# Patient Record
Sex: FEMALE | Race: ASIAN | Employment: FULL TIME | URBAN - METROPOLITAN AREA
[De-identification: names, ages, dates, MRNs, and addresses within clinical notes are randomized per-mention and may not be internally consistent; named-entity substitution may affect disease eponyms.]

---

## 2017-09-22 PROCEDURE — 82746 ASSAY OF FOLIC ACID SERUM: CPT | Performed by: INTERNAL MEDICINE

## 2017-09-22 PROCEDURE — 82607 VITAMIN B-12: CPT | Performed by: INTERNAL MEDICINE

## 2018-11-07 PROCEDURE — 87205 SMEAR GRAM STAIN: CPT | Performed by: INTERNAL MEDICINE

## 2018-11-07 PROCEDURE — 87070 CULTURE OTHR SPECIMN AEROBIC: CPT | Performed by: INTERNAL MEDICINE

## 2018-11-07 PROCEDURE — 87186 SC STD MICRODIL/AGAR DIL: CPT | Performed by: INTERNAL MEDICINE

## 2018-11-07 PROCEDURE — 87077 CULTURE AEROBIC IDENTIFY: CPT | Performed by: INTERNAL MEDICINE

## 2021-04-26 ENCOUNTER — OFFICE VISIT (OUTPATIENT)
Dept: FAMILY MEDICINE CLINIC | Facility: CLINIC | Age: 51
End: 2021-04-26
Payer: COMMERCIAL

## 2021-04-26 VITALS
DIASTOLIC BLOOD PRESSURE: 82 MMHG | BODY MASS INDEX: 30.97 KG/M2 | TEMPERATURE: 98 F | OXYGEN SATURATION: 96 % | SYSTOLIC BLOOD PRESSURE: 120 MMHG | RESPIRATION RATE: 16 BRPM | HEIGHT: 63.5 IN | WEIGHT: 177 LBS | HEART RATE: 66 BPM

## 2021-04-26 DIAGNOSIS — Z00.00 PHYSICAL EXAM, ANNUAL: Primary | ICD-10-CM

## 2021-04-26 DIAGNOSIS — Z12.31 ENCOUNTER FOR SCREENING MAMMOGRAM FOR MALIGNANT NEOPLASM OF BREAST: ICD-10-CM

## 2021-04-26 DIAGNOSIS — Z12.4 SCREENING FOR MALIGNANT NEOPLASM OF CERVIX: ICD-10-CM

## 2021-04-26 DIAGNOSIS — N91.2 AMENORRHEA: ICD-10-CM

## 2021-04-26 DIAGNOSIS — Z13.89 SCREENING FOR GENITOURINARY CONDITION: ICD-10-CM

## 2021-04-26 DIAGNOSIS — Z12.11 SCREEN FOR COLON CANCER: ICD-10-CM

## 2021-04-26 DIAGNOSIS — Z00.00 LABORATORY EXAMINATION ORDERED AS PART OF A ROUTINE GENERAL MEDICAL EXAMINATION: ICD-10-CM

## 2021-04-26 PROCEDURE — 88175 CYTOPATH C/V AUTO FLUID REDO: CPT | Performed by: FAMILY MEDICINE

## 2021-04-26 PROCEDURE — 3074F SYST BP LT 130 MM HG: CPT | Performed by: FAMILY MEDICINE

## 2021-04-26 PROCEDURE — 87624 HPV HI-RISK TYP POOLED RSLT: CPT | Performed by: FAMILY MEDICINE

## 2021-04-26 PROCEDURE — 3008F BODY MASS INDEX DOCD: CPT | Performed by: FAMILY MEDICINE

## 2021-04-26 PROCEDURE — 99386 PREV VISIT NEW AGE 40-64: CPT | Performed by: FAMILY MEDICINE

## 2021-04-26 PROCEDURE — 3079F DIAST BP 80-89 MM HG: CPT | Performed by: FAMILY MEDICINE

## 2021-04-26 NOTE — PROGRESS NOTES
HPI:   Delaney Mcallister is a 45year old female who presents for a complete physical exam. Symptoms: denies discharge, itching, burning or dysuria. She is new in our office. Her previous primary care doctor retired. Patient has no complaints.   Her last menstru Value   12/06/2013 23   05/21/2010 24   07/01/2008 22     ALT (U/L)   Date Value   09/22/2017 55 (H)      Glucose   Date Value Ref Range Status   07/16/2014 81 65 - 99 mg/dL Final   12/06/2013 104 (H) 65 - 99 mg/dL Final   05/21/2010 85 65 - 99 mg/dL Final 3/2020  MUSCULOSKELETAL: denies back pain  NEURO: denies headaches  PSYCHE: denies depression or anxiety  HEMATOLOGIC: denies hx of anemia  ENDOCRINE: denies thyroid history  ALL/ASTHMA: denies hx of allergy or asthma    EXAM:   /82 (BP Location: [place] ordered in this encounter   Call [phone] to schedule Mammogram and fasting blood work. Call GI doctor to schedule colonoscopy. Healthy diet. Stay active.     Imaging & Consults:  GASTRO - INTERNAL  TONIE ANABEL 2D+3D SCREENING BILAT (CPT=[id number])

## 2021-04-26 NOTE — PATIENT INSTRUCTIONS
Call [phone] to schedule Mammogram and fasting blood work. Call GI doctor to schedule colonoscopy. Healthy diet. Stay active.

## 2021-04-27 ENCOUNTER — TELEPHONE (OUTPATIENT)
Dept: FAMILY MEDICINE CLINIC | Facility: CLINIC | Age: 51
End: 2021-04-27

## 2021-04-27 DIAGNOSIS — E55.9 VITAMIN D DEFICIENCY: Primary | ICD-10-CM

## 2021-04-27 NOTE — TELEPHONE ENCOUNTER
Pt checked with insurance and would like to have Vit D level orders put in. Pt's  is requesting a call when orders are entered so they know when to complete.

## 2021-04-27 NOTE — TELEPHONE ENCOUNTER
Call to pt reaches mira/spouse-listed on hipaa consent-advised dr hedrick vitamin d lab order as requested.    mira sts pt will do this with other labs ordered for physical-asks if pt needs to fast.   Advised does need to fast for 12 hours-no food or drink exc

## 2021-05-03 ENCOUNTER — LAB ENCOUNTER (OUTPATIENT)
Dept: LAB | Age: 51
End: 2021-05-03
Attending: FAMILY MEDICINE
Payer: COMMERCIAL

## 2021-05-03 DIAGNOSIS — Z00.00 LABORATORY EXAMINATION ORDERED AS PART OF A ROUTINE GENERAL MEDICAL EXAMINATION: ICD-10-CM

## 2021-05-03 DIAGNOSIS — R73.9 HYPERGLYCEMIA: ICD-10-CM

## 2021-05-03 DIAGNOSIS — Z13.89 SCREENING FOR GENITOURINARY CONDITION: ICD-10-CM

## 2021-05-03 DIAGNOSIS — N91.2 AMENORRHEA: ICD-10-CM

## 2021-05-03 DIAGNOSIS — E55.9 VITAMIN D DEFICIENCY: ICD-10-CM

## 2021-05-03 PROCEDURE — 83036 HEMOGLOBIN GLYCOSYLATED A1C: CPT

## 2021-05-03 PROCEDURE — 84443 ASSAY THYROID STIM HORMONE: CPT

## 2021-05-03 PROCEDURE — 87086 URINE CULTURE/COLONY COUNT: CPT

## 2021-05-03 PROCEDURE — 80053 COMPREHEN METABOLIC PANEL: CPT

## 2021-05-03 PROCEDURE — 81001 URINALYSIS AUTO W/SCOPE: CPT

## 2021-05-03 PROCEDURE — 85025 COMPLETE CBC W/AUTO DIFF WBC: CPT

## 2021-05-03 PROCEDURE — 83001 ASSAY OF GONADOTROPIN (FSH): CPT

## 2021-05-03 PROCEDURE — 82306 VITAMIN D 25 HYDROXY: CPT

## 2021-05-03 PROCEDURE — 36415 COLL VENOUS BLD VENIPUNCTURE: CPT

## 2021-05-03 PROCEDURE — 80061 LIPID PANEL: CPT

## 2021-05-07 ENCOUNTER — PATIENT MESSAGE (OUTPATIENT)
Dept: FAMILY MEDICINE CLINIC | Facility: CLINIC | Age: 51
End: 2021-05-07

## 2021-05-07 ENCOUNTER — ORDER TRANSCRIPTION (OUTPATIENT)
Dept: ADMINISTRATIVE | Facility: HOSPITAL | Age: 51
End: 2021-05-07

## 2021-05-07 DIAGNOSIS — Z13.9 ENCOUNTER FOR SCREENING: Primary | ICD-10-CM

## 2021-05-07 RX ORDER — ERGOCALCIFEROL 1.25 MG/1
50000 CAPSULE ORAL WEEKLY
Qty: 12 CAPSULE | Refills: 0 | Status: SHIPPED | OUTPATIENT
Start: 2021-05-07 | End: 2023-08-18 | Stop reason: ALTCHOICE

## 2021-05-12 NOTE — TELEPHONE ENCOUNTER
From: Igor Giordano  To: Jane Nuñez MD  Sent: 5/7/2021 9:13 PM CDT  Subject: Test Results Question    Dear Dr. Bae,    I got my lab test results of 5/3/2021.  I saw under some results there is a note saying that taking biotin supplement may affect

## 2021-05-12 NOTE — TELEPHONE ENCOUNTER
Yes taking biotin could affect thyroid and FSH. If patient would like to have we could have it rechecked but are not sure how that would be covered with patient insurance.   We do not have biotin listed as one of the medication she is on please update the

## 2021-05-13 RX ORDER — GLUCOSAMINE/CHONDR SU A SOD 750-600 MG
TABLET ORAL DAILY
Refills: 0 | COMMUNITY
Start: 2021-05-13

## 2021-05-13 RX ORDER — MULTIVIT-MIN/IRON/FOLIC ACID/K 18-600-40
CAPSULE ORAL DAILY
Qty: 30 CAPSULE | Refills: 0 | COMMUNITY
Start: 2021-05-13 | End: 2021-06-22

## 2021-05-30 ENCOUNTER — HOSPITAL ENCOUNTER (OUTPATIENT)
Dept: ULTRASOUND IMAGING | Age: 51
Discharge: HOME | End: 2021-05-30
Attending: FAMILY MEDICINE
Payer: COMMERCIAL

## 2021-05-30 DIAGNOSIS — R74.8 ELEVATED LIVER ENZYMES: ICD-10-CM

## 2021-05-30 PROCEDURE — 76700 US EXAM ABDOM COMPLETE: CPT | Performed by: FAMILY MEDICINE

## 2021-06-16 ENCOUNTER — HOSPITAL ENCOUNTER (OUTPATIENT)
Dept: CT IMAGING | Facility: HOSPITAL | Age: 51
Discharge: HOME | End: 2021-06-16
Attending: FAMILY MEDICINE

## 2021-06-16 DIAGNOSIS — Z13.9 ENCOUNTER FOR SCREENING: ICD-10-CM

## 2021-06-16 DIAGNOSIS — Z13.6 SCREENING FOR CARDIOVASCULAR CONDITION: ICD-10-CM

## 2021-06-29 DIAGNOSIS — R73.9 HYPERGLYCEMIA: ICD-10-CM

## 2021-06-29 DIAGNOSIS — E78.5 DYSLIPIDEMIA (HIGH LDL; LOW HDL): ICD-10-CM

## 2021-06-29 DIAGNOSIS — E55.9 VITAMIN D DEFICIENCY: Primary | ICD-10-CM

## 2021-07-03 ENCOUNTER — LAB ENCOUNTER (OUTPATIENT)
Dept: LAB | Facility: HOSPITAL | Age: 51
End: 2021-07-03
Attending: INTERNAL MEDICINE
Payer: COMMERCIAL

## 2021-07-03 DIAGNOSIS — Z01.818 PREOP TESTING: ICD-10-CM

## 2021-07-03 LAB — SARS-COV-2 RNA RESP QL NAA+PROBE: NOT DETECTED

## 2021-07-06 PROBLEM — Z12.11 SPECIAL SCREENING FOR MALIGNANT NEOPLASM OF COLON: Status: ACTIVE | Noted: 2021-07-06

## 2021-07-07 ENCOUNTER — HOSPITAL ENCOUNTER (OUTPATIENT)
Dept: MAMMOGRAPHY | Age: 51
Discharge: HOME | End: 2021-07-07
Attending: FAMILY MEDICINE
Payer: COMMERCIAL

## 2021-07-07 ENCOUNTER — LAB ENCOUNTER (OUTPATIENT)
Dept: LAB | Age: 51
End: 2021-07-07
Attending: FAMILY MEDICINE
Payer: COMMERCIAL

## 2021-07-07 DIAGNOSIS — Z12.31 ENCOUNTER FOR SCREENING MAMMOGRAM FOR MALIGNANT NEOPLASM OF BREAST: ICD-10-CM

## 2021-07-07 DIAGNOSIS — R31.21 ASYMPTOMATIC MICROSCOPIC HEMATURIA: ICD-10-CM

## 2021-07-07 DIAGNOSIS — R74.8 ELEVATED LIVER ENZYMES: ICD-10-CM

## 2021-07-07 LAB
BILIRUB UR QL STRIP.AUTO: NEGATIVE
CLARITY UR REFRACT.AUTO: CLEAR
GLUCOSE UR STRIP.AUTO-MCNC: NEGATIVE MG/DL
HAV IGM SER QL: NONREACTIVE
HBV CORE IGM SER QL: NONREACTIVE
HBV SURFACE AG SERPL QL IA: NONREACTIVE
HCV AB SERPL QL IA: NONREACTIVE
KETONES UR STRIP.AUTO-MCNC: NEGATIVE MG/DL
NITRITE UR QL STRIP.AUTO: NEGATIVE
PH UR STRIP.AUTO: 6 [PH] (ref 5–8)
PROT UR STRIP.AUTO-MCNC: NEGATIVE MG/DL
SP GR UR STRIP.AUTO: 1.01 (ref 1–1.03)
UROBILINOGEN UR STRIP.AUTO-MCNC: <2 MG/DL

## 2021-07-07 PROCEDURE — 36415 COLL VENOUS BLD VENIPUNCTURE: CPT

## 2021-07-07 PROCEDURE — 87086 URINE CULTURE/COLONY COUNT: CPT

## 2021-07-07 PROCEDURE — 81001 URINALYSIS AUTO W/SCOPE: CPT

## 2021-07-07 PROCEDURE — 77067 SCR MAMMO BI INCL CAD: CPT | Performed by: FAMILY MEDICINE

## 2021-07-07 PROCEDURE — 80074 ACUTE HEPATITIS PANEL: CPT

## 2021-07-07 PROCEDURE — 77063 BREAST TOMOSYNTHESIS BI: CPT | Performed by: FAMILY MEDICINE

## 2021-11-29 ENCOUNTER — OFFICE VISIT (OUTPATIENT)
Dept: FAMILY MEDICINE CLINIC | Facility: CLINIC | Age: 51
End: 2021-11-29
Payer: COMMERCIAL

## 2021-11-29 VITALS
RESPIRATION RATE: 16 BRPM | HEIGHT: 63.5 IN | SYSTOLIC BLOOD PRESSURE: 110 MMHG | DIASTOLIC BLOOD PRESSURE: 62 MMHG | HEART RATE: 82 BPM | TEMPERATURE: 98 F | WEIGHT: 177 LBS | OXYGEN SATURATION: 97 % | BODY MASS INDEX: 30.97 KG/M2

## 2021-11-29 DIAGNOSIS — R73.9 HYPERGLYCEMIA: ICD-10-CM

## 2021-11-29 DIAGNOSIS — Z23 NEED FOR VACCINATION: ICD-10-CM

## 2021-11-29 DIAGNOSIS — R31.9 HEMATURIA, UNSPECIFIED TYPE: ICD-10-CM

## 2021-11-29 DIAGNOSIS — E55.9 VITAMIN D DEFICIENCY: ICD-10-CM

## 2021-11-29 DIAGNOSIS — E78.00 HYPERCHOLESTEREMIA: Primary | ICD-10-CM

## 2021-11-29 DIAGNOSIS — R79.89 ELEVATED LIVER FUNCTION TESTS: ICD-10-CM

## 2021-11-29 DIAGNOSIS — G47.9 SLEEP DIFFICULTIES: ICD-10-CM

## 2021-11-29 PROCEDURE — 3008F BODY MASS INDEX DOCD: CPT | Performed by: FAMILY MEDICINE

## 2021-11-29 PROCEDURE — 90471 IMMUNIZATION ADMIN: CPT | Performed by: FAMILY MEDICINE

## 2021-11-29 PROCEDURE — 3078F DIAST BP <80 MM HG: CPT | Performed by: FAMILY MEDICINE

## 2021-11-29 PROCEDURE — 99214 OFFICE O/P EST MOD 30 MIN: CPT | Performed by: FAMILY MEDICINE

## 2021-11-29 PROCEDURE — 90686 IIV4 VACC NO PRSV 0.5 ML IM: CPT | Performed by: FAMILY MEDICINE

## 2021-11-29 PROCEDURE — 3074F SYST BP LT 130 MM HG: CPT | Performed by: FAMILY MEDICINE

## 2021-11-29 RX ORDER — ZOLPIDEM TARTRATE 5 MG
5 TABLET ORAL NIGHTLY PRN
Qty: 30 TABLET | Refills: 1 | Status: SHIPPED | OUTPATIENT
Start: 2021-11-29

## 2021-11-29 NOTE — PATIENT INSTRUCTIONS
Healthy diet low-fat low carbohydrate. Try to lose some weight. Continue vitamin D at current dose. Recheck blood work in January. Stay active. Use Ambien as needed for sleep.

## 2021-11-29 NOTE — PROGRESS NOTES
Keon Garcia is a 45year old female.   Cc discuss test results, hypercholesterolemia hyperglycemia vitamin D deficiency, elevated liver function test hematuria, sleeping difficulty  HPI:   Patient is come to the office to discuss test results she had done in [facility] total) by mouth once a week. 12 capsule 0   • Budesonide-Formoterol Fumarate (SYMBICORT) 160-4.5 MCG/ACT Inhalation Aerosol Inhale 2 puffs into the lungs 2 (two) times daily.  1 Inhaler 0   • Albuterol Sulfate  (90 Base) MCG/ACT Inhalation Aero Soln oriented x3, normal mood   PROCEDURE:  CT CALCIUM SCORING       COMPARISON:  None.       INDICATIONS:  Z13.6 Screening for cardiovascular condition       TECHNIQUE:  The patient was placed in the supine position on the multidector CT table at [facility] physician, if this is not correct please contact Abimael Oviedo at [phone] and he will assign this report to another physician.           Dictated by (CST): Michel Kc MD on 6/24/2021 at 9:17 AM       Finalized by (CST): Michel Kc MD on 6/24/2021 a Panel (14)      Hemoglobin A1C      UA/M With Culture Reflex [E]      FLULAVAL INFLUENZA VACCINE QUAD PRESERVATIVE FREE 0.5 ML      Meds & Refills for this Visit:  Requested Prescriptions     Signed Prescriptions Disp Refills   • AMBIEN 5 MG Oral Tab 30 ta

## 2021-12-06 ENCOUNTER — MED REC SCAN ONLY (OUTPATIENT)
Dept: FAMILY MEDICINE CLINIC | Facility: CLINIC | Age: 51
End: 2021-12-06

## 2022-03-11 ENCOUNTER — LABORATORY ENCOUNTER (OUTPATIENT)
Dept: LAB | Age: 52
End: 2022-03-11
Attending: FAMILY MEDICINE
Payer: COMMERCIAL

## 2022-03-11 DIAGNOSIS — R31.9 HEMATURIA, UNSPECIFIED TYPE: ICD-10-CM

## 2022-03-11 DIAGNOSIS — E55.9 VITAMIN D DEFICIENCY: ICD-10-CM

## 2022-03-11 DIAGNOSIS — E78.5 DYSLIPIDEMIA (HIGH LDL; LOW HDL): ICD-10-CM

## 2022-03-11 DIAGNOSIS — R73.9 HYPERGLYCEMIA: ICD-10-CM

## 2022-03-11 DIAGNOSIS — E78.00 HYPERCHOLESTEREMIA: ICD-10-CM

## 2022-03-11 LAB
ALBUMIN SERPL-MCNC: 4.1 G/DL (ref 3.4–5)
ALBUMIN/GLOB SERPL: 1.1 {RATIO} (ref 1–2)
ALP LIVER SERPL-CCNC: 62 U/L
ALT SERPL-CCNC: 52 U/L
ANION GAP SERPL CALC-SCNC: 6 MMOL/L (ref 0–18)
AST SERPL-CCNC: 31 U/L (ref 15–37)
BILIRUB SERPL-MCNC: 0.9 MG/DL (ref 0.1–2)
BILIRUB UR QL STRIP.AUTO: NEGATIVE
BUN BLD-MCNC: 16 MG/DL (ref 7–18)
CALCIUM BLD-MCNC: 9.8 MG/DL (ref 8.5–10.1)
CHLORIDE SERPL-SCNC: 106 MMOL/L (ref 98–112)
CHOLEST SERPL-MCNC: 280 MG/DL (ref ?–200)
CLARITY UR REFRACT.AUTO: CLEAR
CO2 SERPL-SCNC: 27 MMOL/L (ref 21–32)
COLOR UR AUTO: YELLOW
CREAT BLD-MCNC: 0.84 MG/DL
EST. AVERAGE GLUCOSE BLD GHB EST-MCNC: 114 MG/DL (ref 68–126)
FASTING PATIENT LIPID ANSWER: YES
FASTING STATUS PATIENT QL REPORTED: YES
GLOBULIN PLAS-MCNC: 3.6 G/DL (ref 2.8–4.4)
GLUCOSE BLD-MCNC: 108 MG/DL (ref 70–99)
GLUCOSE UR STRIP.AUTO-MCNC: NEGATIVE MG/DL
HBA1C MFR BLD: 5.6 % (ref ?–5.7)
HDLC SERPL-MCNC: 44 MG/DL (ref 40–59)
KETONES UR STRIP.AUTO-MCNC: NEGATIVE MG/DL
LDLC SERPL CALC-MCNC: 183 MG/DL (ref ?–100)
LEUKOCYTE ESTERASE UR QL STRIP.AUTO: NEGATIVE
NITRITE UR QL STRIP.AUTO: NEGATIVE
NONHDLC SERPL-MCNC: 236 MG/DL (ref ?–130)
OSMOLALITY SERPL CALC.SUM OF ELEC: 290 MOSM/KG (ref 275–295)
PH UR STRIP.AUTO: 7 [PH] (ref 5–8)
POTASSIUM SERPL-SCNC: 4.4 MMOL/L (ref 3.5–5.1)
PROT SERPL-MCNC: 7.7 G/DL (ref 6.4–8.2)
PROT UR STRIP.AUTO-MCNC: NEGATIVE MG/DL
SODIUM SERPL-SCNC: 139 MMOL/L (ref 136–145)
SP GR UR STRIP.AUTO: 1.02 (ref 1–1.03)
TRIGL SERPL-MCNC: 276 MG/DL (ref 30–149)
UROBILINOGEN UR STRIP.AUTO-MCNC: <2 MG/DL
VIT D+METAB SERPL-MCNC: 36.8 NG/ML (ref 30–100)
VLDLC SERPL CALC-MCNC: 58 MG/DL (ref 0–30)

## 2022-03-11 PROCEDURE — 80061 LIPID PANEL: CPT

## 2022-03-11 PROCEDURE — 80053 COMPREHEN METABOLIC PANEL: CPT

## 2022-03-11 PROCEDURE — 83036 HEMOGLOBIN GLYCOSYLATED A1C: CPT

## 2022-03-11 PROCEDURE — 82306 VITAMIN D 25 HYDROXY: CPT

## 2022-03-11 PROCEDURE — 81001 URINALYSIS AUTO W/SCOPE: CPT

## 2022-03-20 ENCOUNTER — TELEPHONE (OUTPATIENT)
Dept: FAMILY MEDICINE CLINIC | Facility: CLINIC | Age: 52
End: 2022-03-20

## 2022-03-20 NOTE — TELEPHONE ENCOUNTER
I have released patient test results to [employer]. Patient needs to review those results or please discuss those results with her over the phone.   Thank you

## 2022-03-21 NOTE — TELEPHONE ENCOUNTER
Call to pt's cell. Reaches identified VM. Per HIPAA consent, LVM requesting call back to triage nurse today for results/instructions. Provided call back number and ofc phone hours.

## 2022-03-24 NOTE — TELEPHONE ENCOUNTER
Record shows pt has not viewed dr Romero [employer] message re lab results. Call to pt's cell reaches identified voice mail. Per hipaa consent, left vmm req call back to triage nurse tomorrow to discuss test results/dr instructions.  Provided ofc phone hours/contact number

## 2022-03-25 NOTE — TELEPHONE ENCOUNTER
Call back from pt-sts she has been out of town, has not viewed lab results. Advised of dr srivastava's comments in lab result notes. Discussed view American [employer] Association web site for info regarding DASH diet or search online for details of this diet-try to follow these recommendations-dr will discuss further at appt next month. Patient voices understanding/agrees with plan/no further questions.

## 2022-04-29 ENCOUNTER — OFFICE VISIT (OUTPATIENT)
Dept: FAMILY MEDICINE CLINIC | Facility: CLINIC | Age: 52
End: 2022-04-29
Payer: COMMERCIAL

## 2022-04-29 VITALS
WEIGHT: 178 LBS | DIASTOLIC BLOOD PRESSURE: 78 MMHG | RESPIRATION RATE: 14 BRPM | SYSTOLIC BLOOD PRESSURE: 102 MMHG | TEMPERATURE: 97 F | HEART RATE: 68 BPM | HEIGHT: 63.5 IN | BODY MASS INDEX: 31.15 KG/M2

## 2022-04-29 DIAGNOSIS — Z00.00 ANNUAL PHYSICAL EXAM: ICD-10-CM

## 2022-04-29 DIAGNOSIS — R73.9 HYPERGLYCEMIA: ICD-10-CM

## 2022-04-29 DIAGNOSIS — Z12.31 ENCOUNTER FOR SCREENING MAMMOGRAM FOR MALIGNANT NEOPLASM OF BREAST: Primary | ICD-10-CM

## 2022-04-29 DIAGNOSIS — E78.00 HYPERCHOLESTEREMIA: ICD-10-CM

## 2022-04-29 DIAGNOSIS — Z00.00 LABORATORY EXAMINATION ORDERED AS PART OF A ROUTINE GENERAL MEDICAL EXAMINATION: ICD-10-CM

## 2022-04-29 PROCEDURE — 99396 PREV VISIT EST AGE 40-64: CPT | Performed by: FAMILY MEDICINE

## 2022-04-29 PROCEDURE — 3078F DIAST BP <80 MM HG: CPT | Performed by: FAMILY MEDICINE

## 2022-04-29 PROCEDURE — 3074F SYST BP LT 130 MM HG: CPT | Performed by: FAMILY MEDICINE

## 2022-04-29 PROCEDURE — 3008F BODY MASS INDEX DOCD: CPT | Performed by: FAMILY MEDICINE

## 2022-04-29 RX ORDER — ZOLPIDEM TARTRATE 10 MG/1
10 TABLET ORAL NIGHTLY
Qty: 30 TABLET | Refills: 2 | Status: SHIPPED | OUTPATIENT
Start: 2022-04-29 | End: 2023-04-24

## 2022-04-29 NOTE — PATIENT INSTRUCTIONS
Hung- shingles shot. Healthy diet. Low-carb low-fat. Stay active. Call [phone] to schedule Mammogram after July 7, 2022. Schedule follow-up visit in October 2022 and do fasting blood work 1 week prior to that visit.

## 2022-04-30 ENCOUNTER — MED REC SCAN ONLY (OUTPATIENT)
Dept: FAMILY MEDICINE CLINIC | Facility: CLINIC | Age: 52
End: 2022-04-30

## 2023-05-08 RX ORDER — ZOLPIDEM TARTRATE 5 MG
5 TABLET ORAL NIGHTLY PRN
Qty: 15 TABLET | Refills: 0 | Status: SHIPPED | OUTPATIENT
Start: 2023-05-08

## 2023-06-05 ENCOUNTER — TELEPHONE (OUTPATIENT)
Dept: FAMILY MEDICINE CLINIC | Facility: CLINIC | Age: 53
End: 2023-06-05

## 2023-06-05 NOTE — TELEPHONE ENCOUNTER
I spoke with pt.s spouse. There was a prescription for Zolpidem 10 mg # 30 0 refills sent to the [address] [place] [address] on Francy Bower on 05/12/23. The order says transmission failed. I called and left a message regarding the refill on the [employer]. I tried to call them three times to speak with them directly and I was sent to voicemails that were not set up.

## 2023-06-05 NOTE — TELEPHONE ENCOUNTER
Pt spouse requesting to s/w nurse regarding Ambien rx. States he was previously speaking with our office regarding the dosage of this medication. Per spouse there was confusion regarding if rx was previously prescribed as 5mg or 10mg. Please contact to discuss further, thank you!

## 2023-08-18 ENCOUNTER — LAB ENCOUNTER (OUTPATIENT)
Dept: LAB | Age: 53
End: 2023-08-18
Attending: FAMILY MEDICINE
Payer: COMMERCIAL

## 2023-08-18 ENCOUNTER — MED REC SCAN ONLY (OUTPATIENT)
Dept: FAMILY MEDICINE CLINIC | Facility: CLINIC | Age: 53
End: 2023-08-18

## 2023-08-18 ENCOUNTER — HOSPITAL ENCOUNTER (OUTPATIENT)
Dept: MAMMOGRAPHY | Age: 53
Discharge: HOME | End: 2023-08-18
Attending: FAMILY MEDICINE
Payer: COMMERCIAL

## 2023-08-18 ENCOUNTER — HOSPITAL ENCOUNTER (OUTPATIENT)
Dept: GENERAL RADIOLOGY | Age: 53
Discharge: HOME | End: 2023-08-18
Attending: FAMILY MEDICINE
Payer: COMMERCIAL

## 2023-08-18 ENCOUNTER — OFFICE VISIT (OUTPATIENT)
Dept: FAMILY MEDICINE CLINIC | Facility: CLINIC | Age: 53
End: 2023-08-18
Payer: COMMERCIAL

## 2023-08-18 VITALS
SYSTOLIC BLOOD PRESSURE: 100 MMHG | WEIGHT: 175 LBS | BODY MASS INDEX: 30.62 KG/M2 | TEMPERATURE: 98 F | DIASTOLIC BLOOD PRESSURE: 74 MMHG | HEIGHT: 63.5 IN | HEART RATE: 68 BPM | RESPIRATION RATE: 18 BRPM

## 2023-08-18 DIAGNOSIS — M25.561 CHRONIC PAIN OF BOTH KNEES: ICD-10-CM

## 2023-08-18 DIAGNOSIS — M25.562 CHRONIC PAIN OF BOTH KNEES: ICD-10-CM

## 2023-08-18 DIAGNOSIS — Z00.00 PHYSICAL EXAM, ANNUAL: ICD-10-CM

## 2023-08-18 DIAGNOSIS — G89.29 CHRONIC PAIN OF BOTH KNEES: ICD-10-CM

## 2023-08-18 DIAGNOSIS — Z12.11 SCREENING FOR COLON CANCER: ICD-10-CM

## 2023-08-18 DIAGNOSIS — R73.9 HYPERGLYCEMIA: ICD-10-CM

## 2023-08-18 DIAGNOSIS — Z13.89 SCREENING FOR GENITOURINARY CONDITION: ICD-10-CM

## 2023-08-18 DIAGNOSIS — H00.15 CHALAZION OF LEFT LOWER EYELID: ICD-10-CM

## 2023-08-18 DIAGNOSIS — Z12.31 SCREENING MAMMOGRAM FOR BREAST CANCER: ICD-10-CM

## 2023-08-18 DIAGNOSIS — R73.9 HYPERGLYCEMIA: Primary | ICD-10-CM

## 2023-08-18 DIAGNOSIS — Z00.00 LABORATORY EXAMINATION ORDERED AS PART OF A ROUTINE GENERAL MEDICAL EXAMINATION: ICD-10-CM

## 2023-08-18 DIAGNOSIS — Z00.00 PHYSICAL EXAM, ANNUAL: Primary | ICD-10-CM

## 2023-08-18 LAB
ALBUMIN SERPL-MCNC: 4.1 G/DL (ref 3.4–5)
ALBUMIN/GLOB SERPL: 1.1 {RATIO} (ref 1–2)
ALP LIVER SERPL-CCNC: 54 U/L
ALT SERPL-CCNC: 56 U/L
ANION GAP SERPL CALC-SCNC: 5 MMOL/L (ref 0–18)
AST SERPL-CCNC: 27 U/L (ref 15–37)
BASOPHILS # BLD AUTO: 0.07 X10(3) UL (ref 0–0.2)
BASOPHILS NFR BLD AUTO: 1.2 %
BILIRUB SERPL-MCNC: 0.9 MG/DL (ref 0.1–2)
BILIRUB UR QL STRIP.AUTO: NEGATIVE
BUN BLD-MCNC: 11 MG/DL (ref 7–18)
CALCIUM BLD-MCNC: 9.6 MG/DL (ref 8.5–10.1)
CHLORIDE SERPL-SCNC: 108 MMOL/L (ref 98–112)
CHOLEST SERPL-MCNC: 263 MG/DL (ref ?–200)
CLARITY UR REFRACT.AUTO: CLEAR
CO2 SERPL-SCNC: 26 MMOL/L (ref 21–32)
CREAT BLD-MCNC: 0.85 MG/DL
EGFRCR SERPLBLD CKD-EPI 2021: 81 ML/MIN/1.73M2 (ref 60–?)
EOSINOPHIL # BLD AUTO: 0.16 X10(3) UL (ref 0–0.7)
EOSINOPHIL NFR BLD AUTO: 2.7 %
ERYTHROCYTE [DISTWIDTH] IN BLOOD BY AUTOMATED COUNT: 11.8 %
FASTING PATIENT LIPID ANSWER: YES
FASTING STATUS PATIENT QL REPORTED: YES
GLOBULIN PLAS-MCNC: 3.6 G/DL (ref 2.8–4.4)
GLUCOSE BLD-MCNC: 111 MG/DL (ref 70–99)
GLUCOSE UR STRIP.AUTO-MCNC: NORMAL MG/DL
HCT VFR BLD AUTO: 44.5 %
HDLC SERPL-MCNC: 40 MG/DL (ref 40–59)
HGB BLD-MCNC: 14.8 G/DL
IMM GRANULOCYTES # BLD AUTO: 0.02 X10(3) UL (ref 0–1)
IMM GRANULOCYTES NFR BLD: 0.3 %
KETONES UR STRIP.AUTO-MCNC: NEGATIVE MG/DL
LDLC SERPL CALC-MCNC: 164 MG/DL (ref ?–100)
LEUKOCYTE ESTERASE UR QL STRIP.AUTO: 25
LYMPHOCYTES # BLD AUTO: 1.97 X10(3) UL (ref 1–4)
LYMPHOCYTES NFR BLD AUTO: 33.6 %
MCH RBC QN AUTO: 32.4 PG (ref 26–34)
MCHC RBC AUTO-ENTMCNC: 33.3 G/DL (ref 31–37)
MCV RBC AUTO: 97.4 FL
MONOCYTES # BLD AUTO: 0.26 X10(3) UL (ref 0.1–1)
MONOCYTES NFR BLD AUTO: 4.4 %
NEUTROPHILS # BLD AUTO: 3.39 X10 (3) UL (ref 1.5–7.7)
NEUTROPHILS # BLD AUTO: 3.39 X10(3) UL (ref 1.5–7.7)
NEUTROPHILS NFR BLD AUTO: 57.8 %
NITRITE UR QL STRIP.AUTO: NEGATIVE
NONHDLC SERPL-MCNC: 223 MG/DL (ref ?–130)
OSMOLALITY SERPL CALC.SUM OF ELEC: 288 MOSM/KG (ref 275–295)
PH UR STRIP.AUTO: 7 [PH] (ref 5–8)
PLATELET # BLD AUTO: 243 10(3)UL (ref 150–450)
POTASSIUM SERPL-SCNC: 4.3 MMOL/L (ref 3.5–5.1)
PROT SERPL-MCNC: 7.7 G/DL (ref 6.4–8.2)
PROT UR STRIP.AUTO-MCNC: NEGATIVE MG/DL
RBC # BLD AUTO: 4.57 X10(6)UL
SODIUM SERPL-SCNC: 139 MMOL/L (ref 136–145)
SP GR UR STRIP.AUTO: 1.02 (ref 1–1.03)
TRIGL SERPL-MCNC: 310 MG/DL (ref 30–149)
TSI SER-ACNC: 0.57 MIU/ML (ref 0.36–3.74)
UROBILINOGEN UR STRIP.AUTO-MCNC: NORMAL MG/DL
VLDLC SERPL CALC-MCNC: 63 MG/DL (ref 0–30)
WBC # BLD AUTO: 5.9 X10(3) UL (ref 4–11)

## 2023-08-18 PROCEDURE — 84443 ASSAY THYROID STIM HORMONE: CPT

## 2023-08-18 PROCEDURE — 99396 PREV VISIT EST AGE 40-64: CPT | Performed by: FAMILY MEDICINE

## 2023-08-18 PROCEDURE — 87086 URINE CULTURE/COLONY COUNT: CPT

## 2023-08-18 PROCEDURE — 3078F DIAST BP <80 MM HG: CPT | Performed by: FAMILY MEDICINE

## 2023-08-18 PROCEDURE — 3074F SYST BP LT 130 MM HG: CPT | Performed by: FAMILY MEDICINE

## 2023-08-18 PROCEDURE — 85025 COMPLETE CBC W/AUTO DIFF WBC: CPT

## 2023-08-18 PROCEDURE — 99213 OFFICE O/P EST LOW 20 MIN: CPT | Performed by: FAMILY MEDICINE

## 2023-08-18 PROCEDURE — 77067 SCR MAMMO BI INCL CAD: CPT | Performed by: FAMILY MEDICINE

## 2023-08-18 PROCEDURE — 3008F BODY MASS INDEX DOCD: CPT | Performed by: FAMILY MEDICINE

## 2023-08-18 PROCEDURE — 73562 X-RAY EXAM OF KNEE 3: CPT | Performed by: FAMILY MEDICINE

## 2023-08-18 PROCEDURE — 83036 HEMOGLOBIN GLYCOSYLATED A1C: CPT

## 2023-08-18 PROCEDURE — 77063 BREAST TOMOSYNTHESIS BI: CPT | Performed by: FAMILY MEDICINE

## 2023-08-18 PROCEDURE — 81001 URINALYSIS AUTO W/SCOPE: CPT

## 2023-08-18 PROCEDURE — 80053 COMPREHEN METABOLIC PANEL: CPT

## 2023-08-18 PROCEDURE — 80061 LIPID PANEL: CPT

## 2023-08-18 RX ORDER — TOBRAMYCIN 3 MG/ML
1 SOLUTION/ DROPS OPHTHALMIC EVERY 4 HOURS
Qty: 5 ML | Refills: 0 | Status: SHIPPED | OUTPATIENT
Start: 2023-08-18

## 2023-08-18 NOTE — PATIENT INSTRUCTIONS
Use tobramycin drops 1 drop 4 times per day to you  left eye. Do warm compresses as discussed in the office. Do x-ray of your knees. You can try glucosamine over-the-counter. Do knee strengthening exercises. Do fasting blood work healthy diet. Stay active. Healthy diet. Stay active.

## 2023-08-19 LAB
EST. AVERAGE GLUCOSE BLD GHB EST-MCNC: 126 MG/DL (ref 68–126)
HBA1C MFR BLD: 6 % (ref ?–5.7)

## 2023-08-21 DIAGNOSIS — R31.9 HEMATURIA OF UNKNOWN CAUSE: Primary | ICD-10-CM

## 2023-08-22 ENCOUNTER — TELEPHONE (OUTPATIENT)
Dept: FAMILY MEDICINE CLINIC | Facility: CLINIC | Age: 53
End: 2023-08-22

## 2023-08-22 DIAGNOSIS — M25.562 CHRONIC PAIN OF BOTH KNEES: Primary | ICD-10-CM

## 2023-08-22 DIAGNOSIS — M25.561 CHRONIC PAIN OF BOTH KNEES: Primary | ICD-10-CM

## 2023-08-22 DIAGNOSIS — G89.29 CHRONIC PAIN OF BOTH KNEES: Primary | ICD-10-CM

## 2023-08-22 NOTE — TELEPHONE ENCOUNTER
Please see XR Left Knee and Right Knee results 8/18/23 for further information. Per Dr. Ji Frazier, she wants patient to try physical therapy and Pt is agreeable with plan of care. Please see pended PT referral, provide diagnosis and sign if appropriate. Thanks.

## 2023-08-23 ENCOUNTER — ORDER TRANSCRIPTION (OUTPATIENT)
Dept: ADMINISTRATIVE | Facility: HOSPITAL | Age: 53
End: 2023-08-23

## 2023-08-23 DIAGNOSIS — Z13.9 ENCOUNTER FOR SCREENING: Primary | ICD-10-CM

## 2023-08-25 ENCOUNTER — HOSPITAL ENCOUNTER (OUTPATIENT)
Dept: ULTRASOUND IMAGING | Age: 53
Discharge: HOME | End: 2023-08-25
Attending: FAMILY MEDICINE

## 2023-08-25 DIAGNOSIS — Z13.9 ENCOUNTER FOR SCREENING: ICD-10-CM

## 2023-09-05 DIAGNOSIS — G47.9 SLEEP DIFFICULTIES: Primary | ICD-10-CM

## 2023-09-05 RX ORDER — ZOLPIDEM TARTRATE 10 MG/1
10 TABLET, FILM COATED ORAL NIGHTLY PRN
Qty: 30 TABLET | Refills: 0 | OUTPATIENT
Start: 2023-09-05

## 2023-09-05 RX ORDER — ZOLPIDEM TARTRATE 5 MG
5 TABLET ORAL NIGHTLY PRN
Qty: 30 TABLET | Refills: 1 | Status: SHIPPED | OUTPATIENT
Start: 2023-09-05 | End: 2023-09-08 | Stop reason: DRUGHIGH

## 2023-09-05 NOTE — TELEPHONE ENCOUNTER
LOV: 08/18/2023  for: CPX  Patient advised to RTC on:  CPX in 1 year. Medication Quantity Refills Start End   AMBIEN 5 MG Oral Tab 15 tablet 0 5/8/2023    Sig:   Take 1 tablet (5 mg total) by mouth nightly as needed for Sleep.

## 2023-09-08 RX ORDER — ZOLPIDEM TARTRATE 10 MG/1
10 TABLET ORAL NIGHTLY
Qty: 30 TABLET | Refills: 0 | Status: SHIPPED | OUTPATIENT
Start: 2023-09-08 | End: 2023-10-08

## 2023-09-08 NOTE — TELEPHONE ENCOUNTER
Pt spouse calling regarding rx request, states incorrect dosage was sent to pharmacy. Spouse states pt only takes 10 MG, not 5 MG? Requesting rx be resubmitted to pharmacy. Spouse also requesting that 5 MG be removed from chart to prevent this issue going forward. Please resubmit and contact pt, thank you!

## 2023-09-29 ENCOUNTER — LAB ENCOUNTER (OUTPATIENT)
Dept: LAB | Age: 53
End: 2023-09-29
Attending: FAMILY MEDICINE
Payer: COMMERCIAL

## 2023-09-29 DIAGNOSIS — R31.9 HEMATURIA OF UNKNOWN CAUSE: ICD-10-CM

## 2023-09-29 LAB
BILIRUB UR QL STRIP.AUTO: NEGATIVE
CLARITY UR REFRACT.AUTO: CLEAR
GLUCOSE UR STRIP.AUTO-MCNC: NORMAL MG/DL
KETONES UR STRIP.AUTO-MCNC: NEGATIVE MG/DL
LEUKOCYTE ESTERASE UR QL STRIP.AUTO: NEGATIVE
NITRITE UR QL STRIP.AUTO: NEGATIVE
PH UR STRIP.AUTO: 5.5 [PH] (ref 5–8)
PROT UR STRIP.AUTO-MCNC: NEGATIVE MG/DL
SP GR UR STRIP.AUTO: 1.01 (ref 1–1.03)
UROBILINOGEN UR STRIP.AUTO-MCNC: NORMAL MG/DL

## 2023-09-29 PROCEDURE — 81001 URINALYSIS AUTO W/SCOPE: CPT

## 2024-02-27 ENCOUNTER — TELEPHONE (OUTPATIENT)
Dept: FAMILY MEDICINE CLINIC | Facility: CLINIC | Age: 54
End: 2024-02-27

## 2024-02-27 DIAGNOSIS — G47.9 SLEEP DIFFICULTIES: Primary | ICD-10-CM

## 2024-02-27 NOTE — TELEPHONE ENCOUNTER
PT would like refill of zolpidem 10 MG Oral Tab Ambien no generic substitute sent to [id number] - [place], [place] - [address] [phone], [phone] [[id number]] Thank you.

## 2024-02-27 NOTE — TELEPHONE ENCOUNTER
Last Refill:  Medication Quantity Refills Start End   zolpidem 10 MG Oral Tab () 30 tablet 0 2023 10/8/2023   Sig:   Take 1 tablet (10 mg total) by mouth nightly.       Pt is asking for Ambien Brand - no generic substitute.     Last OV: 23 Well adult  To return to clinic in: 1 year  Next OV: None scheduled     Please see pended order(s) and sign if appropriate. Thank you.

## 2024-02-28 ENCOUNTER — TELEPHONE (OUTPATIENT)
Dept: FAMILY MEDICINE CLINIC | Facility: CLINIC | Age: 54
End: 2024-02-28

## 2024-02-28 RX ORDER — ZOLPIDEM TARTRATE 10 MG/1
10 TABLET, FILM COATED ORAL NIGHTLY PRN
Qty: 30 TABLET | Refills: 0 | Status: SHIPPED | OUTPATIENT
Start: 2024-02-28

## 2024-04-12 ENCOUNTER — OFFICE VISIT (OUTPATIENT)
Dept: FAMILY MEDICINE CLINIC | Facility: CLINIC | Age: 54
End: 2024-04-12
Payer: COMMERCIAL

## 2024-04-12 VITALS
SYSTOLIC BLOOD PRESSURE: 118 MMHG | RESPIRATION RATE: 16 BRPM | HEART RATE: 67 BPM | DIASTOLIC BLOOD PRESSURE: 76 MMHG | HEIGHT: 63 IN | WEIGHT: 170 LBS | TEMPERATURE: 98 F | OXYGEN SATURATION: 97 % | BODY MASS INDEX: 30.12 KG/M2

## 2024-04-12 DIAGNOSIS — H65.91 RIGHT NON-SUPPURATIVE OTITIS MEDIA: Primary | ICD-10-CM

## 2024-04-12 DIAGNOSIS — H60.501 ACUTE OTITIS EXTERNA OF RIGHT EAR, UNSPECIFIED TYPE: ICD-10-CM

## 2024-04-12 DIAGNOSIS — T16.1XXA FOREIGN BODY OF RIGHT EAR, INITIAL ENCOUNTER: ICD-10-CM

## 2024-04-12 RX ORDER — CEFDINIR 300 MG/1
300 CAPSULE ORAL 2 TIMES DAILY
Qty: 20 CAPSULE | Refills: 0 | Status: SHIPPED | OUTPATIENT
Start: 2024-04-12 | End: 2024-04-22

## 2024-04-12 RX ORDER — OFLOXACIN 3 MG/ML
10 SOLUTION AURICULAR (OTIC) DAILY
Qty: 1 EACH | Refills: 0 | Status: SHIPPED | OUTPATIENT
Start: 2024-04-12 | End: 2024-04-22

## 2024-04-12 NOTE — PATIENT INSTRUCTIONS
Tylenol OTC for pain   Keep ear dry   Do not put anything inside the ear   Ear drop and oral antibiotic   Please follow up with PCP if no improvement or if symptoms worsen

## 2024-04-12 NOTE — PROGRESS NOTES
CHIEF COMPLAINT:     Chief Complaint   Patient presents with    Ear Pain     Yesterday night, right side, felt plugged in shower, tried to cotton swap but got worse, pain  OTC tylenol       HPI:   So Drummond is a 54 year old female who presents to clinic today with complaints of right ear pain. Started as fullness and now pain. Per patient she used cotton after showers to dry ears. no ear discharge. + decreased hearing. No fever. no body aches/chills. No headache. No nasal congestion. No  sore throat. No cough. No chest pain or SOB. No GI symptoms. Taking tylenol OTC     Current Outpatient Medications   Medication Sig Dispense Refill    cefdinir 300 MG Oral Cap Take 1 capsule (300 mg total) by mouth 2 (two) times daily for 10 days. 20 capsule 0    ofloxacin 0.3 % Otic Solution Place 10 drops into the right ear daily for 10 days. 1 each 0    AMBIEN 10 MG Oral Tab Take 1 tablet (10 mg total) by mouth nightly as needed for Sleep. 30 tablet 0    tobramycin 0.3 % Ophthalmic Solution Place 1 drop into the left eye every 4 (four) hours. 5 mL 0    Budesonide-Formoterol Fumarate (SYMBICORT) 160-4.5 MCG/ACT Inhalation Aerosol Inhale 2 puffs into the lungs 2 (two) times daily. 1 Inhaler 0    Albuterol Sulfate  (90 Base) MCG/ACT Inhalation Aero Soln Inhale 1 puff into the lungs every 6 (six) hours as needed for Wheezing. 1 Inhaler 0      Past Medical History:    Blood in urine    Found by lab test of annual physical check    Headache disorder    long time, occasional migraine    Heartburn    frequent    Hemorrhoids    long time, occasional    High cholesterol    long time    Menses painful    Yes before menopause    Moderate persistent asthma without complication (HCC)    SINUSITIS    Wears glasses      Social History:  Social History     Socioeconomic History    Marital status:     Number of children: 2   Occupational History    Occupation: Managment   Tobacco Use    Smoking status: Never    Smokeless tobacco:  Never   Vaping Use    Vaping status: Never Used   Substance and Sexual Activity    Alcohol use: No     Alcohol/week: 0.0 standard drinks of alcohol    Drug use: No    Sexual activity: Yes     Partners: Male   Other Topics Concern     Service No    Blood Transfusions No    Caffeine Concern No    Occupational Exposure No    Hobby Hazards No    Sleep Concern No    Stress Concern No    Weight Concern No    Special Diet No    Back Care Yes    Exercise Yes    Bike Helmet No    Seat Belt Yes    Self-Exams Yes   Social History Narrative        ObGynHx:    First Menses: 11    Cycle Characteristics: regular every 28-30 days    Menopause: n/a    ObHx:     Breast: no biopsies or lumps        Social History:      Marital Status and Family/Children: , 2 children, 1 brother    Occupation/Financial Situation: works in management, 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  CARDIOVASCULAR: No chest pain, palpitations  GI: No N/V/C/D.  NEURO: denies headaches or dizziness    EXAM:   /76   Pulse 67   Temp 97.5 °F (36.4 °C)   Resp 16   Ht 5' 3\" (1.6 m)   Wt 170 lb (77.1 kg)   SpO2 97%   BMI 30.11 kg/m²   Physical Exam  Constitutional:       Appearance: Normal appearance.   HENT:      Head: Normocephalic and atraumatic.      Right Ear: Drainage (purulent discharge in canal) present. A foreign body (cotton ball visible in ear canal) is present. Tympanic membrane is erythematous.      Left Ear: Tympanic membrane, ear canal and external ear normal.      Ears:      Comments: Cotton ball removed with ear irrigation      Nose: Nose normal.       Mouth/Throat:      Mouth: Mucous membranes are moist.      Pharynx: Oropharynx is clear. No posterior oropharyngeal erythema.   Eyes:      Conjunctiva/sclera: Conjunctivae normal.      Pupils: Pupils are equal, round, and reactive to light.   Cardiovascular:      Rate and Rhythm: Normal rate and regular rhythm.      Heart sounds: Normal heart sounds. No murmur heard.  Pulmonary:      Effort: Pulmonary effort is normal.      Breath sounds: Normal breath sounds. No wheezing or rhonchi.   Musculoskeletal:      Cervical back: Normal range of motion and neck supple.   Lymphadenopathy:      Cervical: No cervical adenopathy.   Skin:     General: Skin is warm.      Findings: No rash.   Neurological:      Mental Status: She is alert and oriented to person, place, and time.       No results found for this or any previous visit (from the past 24 hour(s)).    ASSESSMENT AND PLAN:   So Drummond is a 54 year old female who presents with ear problems symptoms are consistent with    ASSESSMENT:  Encounter Diagnoses   Name Primary?    Right non-suppurative otitis media Yes    Acute otitis externa of right ear, unspecified type     Foreign body of right ear, initial encounter      Foreign body Removal Procedure  Patient gave verbal consent.  Risks and Benefits of removal were discussed with the patient, who agreed to proceed with procedure.  Right Ear   Indication: FB present   Right Ear irrigated with water via 30 ml syringe  Lighted curette then used to grab cotton   Cotton ball completely removed   Patient Status Tolerated Well  No complications    PLAN: Meds as listed below.  Comfort measures as described in Patient Instructions    Meds & Refills for this Visit:  Requested Prescriptions     Signed Prescriptions Disp Refills    cefdinir 300 MG Oral Cap 20 capsule 0     Sig: Take 1 capsule (300 mg total) by mouth 2 (two) times daily for 10 days.    ofloxacin 0.3 % Otic Solution 1 each 0     Sig: Place 10 drops into the right ear  daily for 10 days.         Risk and benefits of medication discussed. Does not want amoxicillin   If antibiotics prescribed, stressed importance of completing full course of antibiotic.           Patient voiced understand and is in agreement with treatment plan.    Patient Instructions   Tylenol OTC for pain   Keep ear dry   Do not put anything inside the ear   Ear drop and oral antibiotic   Please follow up with PCP if no improvement or if symptoms worsen

## 2024-08-08 DIAGNOSIS — G47.9 SLEEP DIFFICULTIES: ICD-10-CM

## 2024-08-09 RX ORDER — ZOLPIDEM TARTRATE 10 MG/1
10 TABLET, FILM COATED ORAL NIGHTLY PRN
Qty: 30 TABLET | Refills: 0 | Status: SHIPPED | OUTPATIENT
Start: 2024-08-09

## 2024-08-09 NOTE — TELEPHONE ENCOUNTER
Last Office Visit: 8/18/23  Last Refill: 2/28/24  Return to Clinic: 1 year  Protocol: failed  NOV: 9/17/24  Requested Prescriptions     Pending Prescriptions Disp Refills    AMBIEN 10 MG Oral Tab [Pharmacy Med Name: Ambien 10 Mg Tab Rajendra] 30 tablet 0     Sig: TAKE 1 TABLET BY MOUTH NIGHTLY AS NEEDED FOR SLEEP         Please approve if appropriate.     Thank you!

## 2024-10-21 ENCOUNTER — OFFICE VISIT (OUTPATIENT)
Dept: FAMILY MEDICINE CLINIC | Facility: CLINIC | Age: 54
End: 2024-10-21
Payer: COMMERCIAL

## 2024-10-21 VITALS
TEMPERATURE: 97 F | SYSTOLIC BLOOD PRESSURE: 100 MMHG | RESPIRATION RATE: 16 BRPM | HEIGHT: 63 IN | BODY MASS INDEX: 31.54 KG/M2 | DIASTOLIC BLOOD PRESSURE: 60 MMHG | WEIGHT: 178 LBS | HEART RATE: 70 BPM

## 2024-10-21 DIAGNOSIS — Z12.4 SCREENING FOR CERVICAL CANCER: ICD-10-CM

## 2024-10-21 DIAGNOSIS — Z12.31 SCREENING MAMMOGRAM FOR BREAST CANCER: ICD-10-CM

## 2024-10-21 DIAGNOSIS — Z13.89 SCREENING FOR GENITOURINARY CONDITION: ICD-10-CM

## 2024-10-21 DIAGNOSIS — Z00.00 PHYSICAL EXAM, ANNUAL: Primary | ICD-10-CM

## 2024-10-21 DIAGNOSIS — Z00.00 LABORATORY EXAMINATION ORDERED AS PART OF A ROUTINE GENERAL MEDICAL EXAMINATION: ICD-10-CM

## 2024-10-21 DIAGNOSIS — G47.9 SLEEP DIFFICULTIES: ICD-10-CM

## 2024-10-21 DIAGNOSIS — R73.9 HYPERGLYCEMIA: ICD-10-CM

## 2024-10-21 DIAGNOSIS — Z01.411 ABNORMAL FEMALE PELVIC EXAM: ICD-10-CM

## 2024-10-21 DIAGNOSIS — R19.00 PELVIC FULLNESS: ICD-10-CM

## 2024-10-21 PROCEDURE — 87624 HPV HI-RISK TYP POOLED RSLT: CPT | Performed by: FAMILY MEDICINE

## 2024-10-21 PROCEDURE — 88175 CYTOPATH C/V AUTO FLUID REDO: CPT | Performed by: FAMILY MEDICINE

## 2024-10-21 NOTE — PROGRESS NOTES
HPI:   So Drummond is a 54 year old female who presents for a complete physical exam. Symptoms: denies discharge, itching, burning or dysuria.       Patient started to do more  exercises with  and  doing better with that.      Cholesterol numbers came back higher than previously sure about diet and exercise.  Heart scan score came back 0.  Her last menstrual period was March 2020.  Patient has some fulness on examination of the pelvis - we will do ultrasound.       Immunization History   Administered Date(s) Administered    Covid-19 Vaccine Pfizer 30 mcg/0.3 ml 04/10/2021, 05/01/2021, 12/30/2021    FLU VAC QIV SPLIT 3 YRS AND OLDER (81405) 09/22/2017, 10/16/2018    FLULAVAL 6 months & older 0.5 ml Prefilled syringe (67738) 11/29/2021    Fluvirin, 3 Years & >, Im 10/17/2013    Influenza 11/06/2013, 09/15/2019    Influenza Vaccine, trivalent (IIV3), 0.5mL IM 11/14/2009    Influenza Vaccine, trivalent (IIV3), PF 0.5mL (27395) 10/21/2024    Pneumococcal (Prevnar 13) 07/06/2016    TDAP 09/22/2017      Wt Readings from Last 6 Encounters:   10/21/24 178 lb (80.7 kg)   04/12/24 170 lb (77.1 kg)   08/18/23 175 lb (79.4 kg)   04/29/22 178 lb (80.7 kg)   11/29/21 177 lb (80.3 kg)   06/22/21 177 lb (80.3 kg)     Body mass index is 31.53 kg/m².     Cholesterol, Total (mg/dL)   Date Value   08/18/2023 263 (H)   03/11/2022 280 (H)   05/03/2021 254 (H)   07/16/2014 240 (H)   12/06/2013 212 (H)   05/21/2010 238 (H)     Total Cholesterol (mg/dL)   Date Value   06/26/2016 217 (A)     Cholesterol (mg/dL)   Date Value   05/18/2018 217 (H)   09/22/2017 240 (H)     HDL Cholesterol (mg/dL)   Date Value   08/18/2023 40   03/11/2022 44   05/03/2021 40   07/16/2014 42   12/06/2013 34 (L)   05/21/2010 43     Direct HDL (mg/dL)   Date Value   05/18/2018 37 (L)   09/22/2017 38 (L)   06/26/2016 33     LDL Cholesterol Calc (mg/dL)   Date Value   07/16/2014 164 (H)   12/06/2013 145 (H)   05/21/2010 164 (H)     LDL Cholesterol (mg/dL)    Date Value   08/18/2023 164 (H)   03/11/2022 183 (H)   05/03/2021 164 (H)   06/26/2016 144 (A)     Calculated LDL (mg/dL)   Date Value   05/18/2018 137 (H)   09/22/2017 163 (H)     Triglycerides (mg/dL)   Date Value   06/26/2016 199   07/16/2014 168 (H)   12/06/2013 166 (H)     AST (SGOT) (IU/L)   Date Value   12/06/2013 16   05/21/2010 17   07/01/2008 20     AST (U/L)   Date Value   08/18/2023 27   03/11/2022 31   05/03/2021 45 (H)   09/22/2017 28     ALT (SGPT) (IU/L)   Date Value   12/06/2013 23   05/21/2010 24   07/01/2008 22     ALT (U/L)   Date Value   08/18/2023 56   03/11/2022 52   05/03/2021 78 (H)   09/22/2017 55 (H)      Glucose   Date Value Ref Range Status   07/16/2014 81 65 - 99 mg/dL Final   12/06/2013 104 (H) 65 - 99 mg/dL Final   05/21/2010 85 65 - 99 mg/dL Final   07/01/2008 92 65 - 99 mg/dL Final        Current Outpatient Medications   Medication Sig Dispense Refill    AMBIEN 10 MG Oral Tab TAKE 1 TABLET BY MOUTH NIGHTLY AS NEEDED FOR SLEEP 30 tablet 0    Budesonide-Formoterol Fumarate (SYMBICORT) 160-4.5 MCG/ACT Inhalation Aerosol Inhale 2 puffs into the lungs 2 (two) times daily. 1 Inhaler 0    Albuterol Sulfate  (90 Base) MCG/ACT Inhalation Aero Soln Inhale 1 puff into the lungs every 6 (six) hours as needed for Wheezing. 1 Inhaler 0      Past Medical History:    Blood in urine    Found by lab test of annual physical check    Headache disorder    long time, occasional migraine    Heartburn    frequent    Hemorrhoids    long time, occasional    High cholesterol    long time    Menses painful    Yes before menopause    Moderate persistent asthma without complication (HCC)    SINUSITIS    Wears glasses      History reviewed. No pertinent surgical history.   Family History   Problem Relation Age of Onset    Hypertension Father     Hypertension Mother     Cancer Cousin 50        breast    Breast Cancer Cousin 50        maternal cousin at age of 50    No Known Problems Brother     Heart  Disorder Neg     Diabetes Neg       Social History:   Social History     Socioeconomic History    Marital status:     Number of children: 2   Occupational History    Occupation: Managment   Tobacco Use    Smoking status: Never    Smokeless tobacco: Never   Vaping Use    Vaping status: Never Used   Substance and Sexual Activity    Alcohol use: No     Alcohol/week: 0.0 standard drinks of alcohol    Drug use: No    Sexual activity: Yes     Partners: Male   Other Topics Concern     Service No    Blood Transfusions No    Caffeine Concern No    Occupational Exposure No    Hobby Hazards No    Sleep Concern No    Stress Concern No    Weight Concern No    Special Diet No    Back Care Yes    Exercise Yes    Bike Helmet No    Seat Belt Yes    Self-Exams Yes   Social History Narrative        ObGynHx:    First Menses: 11    Cycle Characteristics: regular every 28-30 days    Menopause: n/a    ObHx:     Breast: no biopsies or lumps        Social History:      Marital Status and Family/Children: , 2 children, 1 brother    Occupation/Financial Situation: works in management, 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 Children: 2  Exercise: walking.  Diet: watches calories closely     REVIEW OF SYSTEMS:   GENERAL: feels well otherwise  SKIN: denies any unusual skin lesions  EYES:denies blurred vision or double vision,   HEENT: denies nasal congestion, sinus pain or ST  LUNGS: denies shortness of breath with exertion  CARDIOVASCULAR: denies chest pain on exertion  GI: denies abdominal pain,denies heartburn  : denies dysuria, vaginal discharge or itching,periods absent LMP 3/2020  MUSCULOSKELETAL: denies back pain   NEURO: denies  headaches  PSYCHE: denies depression or anxiety  HEMATOLOGIC: denies hx of anemia  ENDOCRINE: denies thyroid history  ALL/ASTHMA: denies hx of allergy or asthma    EXAM:   /60 (BP Location: Right arm, Patient Position: Sitting, Cuff Size: adult)   Pulse 70   Temp 97 °F (36.1 °C) (Temporal)   Resp 16   Ht 5' 3\" (1.6 m)   Wt 178 lb (80.7 kg)   BMI 31.53 kg/m²   Body mass index is 31.53 kg/m².   GENERAL: well developed, well nourished,in no apparent distress  SKIN: no rashes,no suspicious lesions  HEENT: atraumatic, normocephalic,ears are clear  EYES:PERRLA, EOMI, conjunctiva are clear,   NECK: supple,no adenopathy,  CHEST: no chest tenderness  BREAST: no dominant or suspicious mass, no supraclavicular no axillary adenopathy  LUNGS: clear to auscultation  CARDIO: RRR without murmur  GI: good BS's,no masses, HSM or tenderness  :   normal introitus, small skin tag, normal vaginal walls, cervix pink Pap smear collected no adnexal tenderness ,no cervical motion tenderness ,there are some fullness seen on examination of the pelvis possible fibroid ultrasound  RECTAL: Deferred  MUSCULOSKELETAL: back is not tender,FROM of the back  EXTREMITIES: no cyanosis, clubbing or edema  NEURO: Oriented times three,cranial nerves are intact,motor and sensory are grossly intact    Results for orders placed or performed in visit on 09/29/23   Urinalysis with Culture Reflex    Collection Time: 09/29/23  9:26 AM    Specimen: Urine, clean catch   Result Value Ref Range    Urine Color Light-Yellow Yellow    Clarity Urine Clear Clear    Spec Gravity 1.012 1.005 - 1.030    Glucose Urine Normal Normal mg/dL    Bilirubin Urine Negative Negative    Ketones Urine Negative Negative mg/dL    Blood Urine Trace (A) Negative    pH Urine 5.5 5.0 - 8.0    Protein Urine Negative Negative mg/dL    Urobilinogen Urine Normal Normal mg/dL    Nitrite Urine Negative Negative    Leukocyte Esterase Urine Negative Negative    WBC Urine 1-5 0 - 5 /HPF     RBC Urine 0-2 0 - 2 /HPF    Bacteria Urine Rare (A) None Seen /HPF    Squamous Epi. Cells Few (A) None Seen /HPF    Renal Tubular Epithelial Cells None Seen None Seen /HPF    Transitional Cells None Seen None Seen /HPF    Yeast Urine None Seen None Seen /HPF     ASSESSMENT AND PLAN:   So Drummond is a 54 year old female who presents for a complete physical exam.  Encounter Diagnoses   Name Primary?    Physical exam, annual Yes    Laboratory examination ordered as part of a routine general medical examination     Screening for genitourinary condition     Hyperglycemia     Screening mammogram for breast cancer     Screening for cervical cancer     Pelvic fullness     Abnormal female pelvic exam        Orders Placed This Encounter   Procedures    CBC With Differential With Platelet    Comp Metabolic Panel (14)    Hemoglobin A1C    Lipid Panel    Urinalysis with Culture Reflex    TSH W Reflex To Free T4    Hpv High Risk , Thin Prep Collect    Fluzone trivalent vaccine, PF 0.5mL, 6mo+ (55978)    ThinPrep PAP Smear B       Meds & Refills for this Visit:  Requested Prescriptions      No prescriptions requested or ordered in this encounter   Healthy diet.  Stay active.   Call [phone] to schedule Mammogram, fasting blood work and ultrasound of the pelvis.  Consider getting Shingrix shingles vaccination.       Imaging & Consults:  INFLUENZA VACCINE, TRI, PRESERV FREE, 0.5 ML  TONIE ANABEL 2D+3D SCREENING BILAT (CPT=77067/19294)  US PELVIS W EV (CPT=76856/57109)   Pap and pelvic done in 2021   Order put in for mammogram . Self breast exam explained. Health maintenance, will check fasting Lipids, CMP, and CBC. Pt is up-to-date with  screening colonoscopy. Pt' s weight is Body mass index is 31.53 kg/m²., recommended low carb diet and aerobic exercise 30 minutes three times weekly.  The patient indicates understanding of these issues and agrees to the plan.  The patient is asked to return for CPX in 1 year.

## 2024-10-21 NOTE — PATIENT INSTRUCTIONS
Healthy diet.  Stay active.   Call [phone] to schedule Mammogram, fasting blood work and ultrasound of the pelvis.  Consider getting Shingrix shingles vaccination.  
71

## 2024-10-22 LAB — HPV E6+E7 MRNA CVX QL NAA+PROBE: NEGATIVE

## 2024-10-23 NOTE — TELEPHONE ENCOUNTER
LOV: 10/21/2024 for: CPX  Patient advised to RTC on:   CPX in 1 year.     Medication Quantity Refills Start End   AMBIEN 10 MG Oral Tab 30 tablet 0 8/9/2024 --   Sig:   TAKE 1 TABLET BY MOUTH NIGHTLY AS NEEDED FOR SLEEP

## 2024-10-24 RX ORDER — ZOLPIDEM TARTRATE 10 MG/1
10 TABLET, FILM COATED ORAL NIGHTLY PRN
Qty: 30 TABLET | Refills: 0 | Status: SHIPPED | OUTPATIENT
Start: 2024-10-24

## 2024-10-25 LAB
.: NORMAL
.: NORMAL

## 2024-10-29 ENCOUNTER — TELEPHONE (OUTPATIENT)
Dept: FAMILY MEDICINE CLINIC | Facility: CLINIC | Age: 54
End: 2024-10-29

## 2024-10-29 NOTE — TELEPHONE ENCOUNTER
1. What are your symptoms?  Sore throat, congestion, \"feels like head is burning\"       2. How long have you been having these symptoms?  For the past 5 days      3. Have you done anything already to treat your symptoms?   Tylenol, cough drops      ADDITIONAL INFO:    Patient states she feels like she has the flu.    Please advise

## 2024-10-29 NOTE — TELEPHONE ENCOUNTER
Patient called back she was notified to go to the urgent care per Dr. Hansen's recommendation. Pt. Agreed to plan and verbalized understanding

## 2024-10-29 NOTE — TELEPHONE ENCOUNTER
Patient complained of following symptoms:  Worsening Sore throat onset Thursday night  Nasal and throat congestion  Headaches  Mucus secretions  Chills    Symptoms onset: Thursday 10/24/2024  Hx of Covid 1st week of September 2024    Interventions at home: Tylenol PRN    Advised patient to get evaluated in urgent care due to worsening symptoms but she wants Dr. Hansen's recommendation.     Routing to provider for advice. Thank you.

## 2024-11-14 ENCOUNTER — HOSPITAL ENCOUNTER (OUTPATIENT)
Dept: ULTRASOUND IMAGING | Age: 54
Discharge: HOME | End: 2024-11-14
Attending: FAMILY MEDICINE
Payer: COMMERCIAL

## 2024-11-14 ENCOUNTER — LAB ENCOUNTER (OUTPATIENT)
Dept: LAB | Age: 54
End: 2024-11-14
Attending: FAMILY MEDICINE
Payer: COMMERCIAL

## 2024-11-14 ENCOUNTER — HOSPITAL ENCOUNTER (OUTPATIENT)
Dept: MAMMOGRAPHY | Age: 54
Discharge: HOME | End: 2024-11-14
Attending: FAMILY MEDICINE
Payer: COMMERCIAL

## 2024-11-14 ENCOUNTER — PATIENT MESSAGE (OUTPATIENT)
Dept: FAMILY MEDICINE CLINIC | Facility: CLINIC | Age: 54
End: 2024-11-14

## 2024-11-14 DIAGNOSIS — Z12.31 SCREENING MAMMOGRAM FOR BREAST CANCER: ICD-10-CM

## 2024-11-14 DIAGNOSIS — R19.00 PELVIC FULLNESS: ICD-10-CM

## 2024-11-14 DIAGNOSIS — Z13.89 SCREENING FOR GENITOURINARY CONDITION: ICD-10-CM

## 2024-11-14 DIAGNOSIS — Z00.00 PHYSICAL EXAM, ANNUAL: ICD-10-CM

## 2024-11-14 DIAGNOSIS — Z01.411 ABNORMAL FEMALE PELVIC EXAM: ICD-10-CM

## 2024-11-14 DIAGNOSIS — R73.9 HYPERGLYCEMIA: ICD-10-CM

## 2024-11-14 LAB
ALBUMIN SERPL-MCNC: 4.3 G/DL (ref 3.2–4.8)
ALBUMIN/GLOB SERPL: 1.2 {RATIO} (ref 1–2)
ALP LIVER SERPL-CCNC: 48 U/L
ALT SERPL-CCNC: 39 U/L
ANION GAP SERPL CALC-SCNC: 5 MMOL/L (ref 0–18)
AST SERPL-CCNC: 31 U/L (ref ?–34)
BASOPHILS # BLD AUTO: 0.07 X10(3) UL (ref 0–0.2)
BASOPHILS NFR BLD AUTO: 1 %
BILIRUB SERPL-MCNC: 1.2 MG/DL (ref 0.3–1.2)
BILIRUB UR QL STRIP.AUTO: NEGATIVE
BUN BLD-MCNC: 13 MG/DL (ref 9–23)
CALCIUM BLD-MCNC: 9.9 MG/DL (ref 8.7–10.4)
CHLORIDE SERPL-SCNC: 103 MMOL/L (ref 98–112)
CHOLEST SERPL-MCNC: 222 MG/DL (ref ?–200)
CLARITY UR REFRACT.AUTO: CLEAR
CO2 SERPL-SCNC: 28 MMOL/L (ref 21–32)
COLOR UR AUTO: COLORLESS
CREAT BLD-MCNC: 0.76 MG/DL
EGFRCR SERPLBLD CKD-EPI 2021: 92 ML/MIN/1.73M2 (ref 60–?)
EOSINOPHIL # BLD AUTO: 0.17 X10(3) UL (ref 0–0.7)
EOSINOPHIL NFR BLD AUTO: 2.4 %
ERYTHROCYTE [DISTWIDTH] IN BLOOD BY AUTOMATED COUNT: 12 %
EST. AVERAGE GLUCOSE BLD GHB EST-MCNC: 120 MG/DL (ref 68–126)
FASTING PATIENT LIPID ANSWER: YES
FASTING STATUS PATIENT QL REPORTED: YES
GLOBULIN PLAS-MCNC: 3.5 G/DL (ref 2–3.5)
GLUCOSE BLD-MCNC: 97 MG/DL (ref 70–99)
GLUCOSE UR STRIP.AUTO-MCNC: NORMAL MG/DL
HBA1C MFR BLD: 5.8 % (ref ?–5.7)
HCT VFR BLD AUTO: 40.8 %
HDLC SERPL-MCNC: 44 MG/DL (ref 40–59)
HGB BLD-MCNC: 14 G/DL
IMM GRANULOCYTES # BLD AUTO: 0.02 X10(3) UL (ref 0–1)
IMM GRANULOCYTES NFR BLD: 0.3 %
KETONES UR STRIP.AUTO-MCNC: NEGATIVE MG/DL
LDLC SERPL CALC-MCNC: 139 MG/DL (ref ?–100)
LEUKOCYTE ESTERASE UR QL STRIP.AUTO: NEGATIVE
LYMPHOCYTES # BLD AUTO: 2.54 X10(3) UL (ref 1–4)
LYMPHOCYTES NFR BLD AUTO: 36.6 %
MCH RBC QN AUTO: 33 PG (ref 26–34)
MCHC RBC AUTO-ENTMCNC: 34.3 G/DL (ref 31–37)
MCV RBC AUTO: 96.2 FL
MONOCYTES # BLD AUTO: 0.39 X10(3) UL (ref 0.1–1)
MONOCYTES NFR BLD AUTO: 5.6 %
NEUTROPHILS # BLD AUTO: 3.75 X10 (3) UL (ref 1.5–7.7)
NEUTROPHILS # BLD AUTO: 3.75 X10(3) UL (ref 1.5–7.7)
NEUTROPHILS NFR BLD AUTO: 54.1 %
NITRITE UR QL STRIP.AUTO: NEGATIVE
NONHDLC SERPL-MCNC: 178 MG/DL (ref ?–130)
OSMOLALITY SERPL CALC.SUM OF ELEC: 282 MOSM/KG (ref 275–295)
PH UR STRIP.AUTO: 6.5 [PH] (ref 5–8)
PLATELET # BLD AUTO: 224 10(3)UL (ref 150–450)
POTASSIUM SERPL-SCNC: 4 MMOL/L (ref 3.5–5.1)
PROT SERPL-MCNC: 7.8 G/DL (ref 5.7–8.2)
PROT UR STRIP.AUTO-MCNC: NEGATIVE MG/DL
RBC # BLD AUTO: 4.24 X10(6)UL
SODIUM SERPL-SCNC: 136 MMOL/L (ref 136–145)
SP GR UR STRIP.AUTO: <1.005 (ref 1–1.03)
T3FREE SERPL-MCNC: 2.95 PG/ML (ref 2.4–4.2)
T4 FREE SERPL-MCNC: 1.5 NG/DL (ref 0.8–1.7)
TRIGL SERPL-MCNC: 214 MG/DL (ref 30–149)
TSI SER-ACNC: 0.46 UIU/ML (ref 0.55–4.78)
UROBILINOGEN UR STRIP.AUTO-MCNC: NORMAL MG/DL
VLDLC SERPL CALC-MCNC: 40 MG/DL (ref 0–30)
WBC # BLD AUTO: 6.9 X10(3) UL (ref 4–11)

## 2024-11-14 PROCEDURE — 77063 BREAST TOMOSYNTHESIS BI: CPT | Performed by: FAMILY MEDICINE

## 2024-11-14 PROCEDURE — 80061 LIPID PANEL: CPT

## 2024-11-14 PROCEDURE — 81001 URINALYSIS AUTO W/SCOPE: CPT

## 2024-11-14 PROCEDURE — 76856 US EXAM PELVIC COMPLETE: CPT | Performed by: FAMILY MEDICINE

## 2024-11-14 PROCEDURE — 80053 COMPREHEN METABOLIC PANEL: CPT

## 2024-11-14 PROCEDURE — 84481 FREE ASSAY (FT-3): CPT

## 2024-11-14 PROCEDURE — 77067 SCR MAMMO BI INCL CAD: CPT | Performed by: FAMILY MEDICINE

## 2024-11-14 PROCEDURE — 36415 COLL VENOUS BLD VENIPUNCTURE: CPT

## 2024-11-14 PROCEDURE — 84439 ASSAY OF FREE THYROXINE: CPT

## 2024-11-14 PROCEDURE — 76830 TRANSVAGINAL US NON-OB: CPT | Performed by: FAMILY MEDICINE

## 2024-11-14 PROCEDURE — 83036 HEMOGLOBIN GLYCOSYLATED A1C: CPT

## 2024-11-14 PROCEDURE — 84443 ASSAY THYROID STIM HORMONE: CPT

## 2024-11-14 PROCEDURE — 85025 COMPLETE CBC W/AUTO DIFF WBC: CPT

## 2024-11-15 DIAGNOSIS — R79.89 LOW TSH LEVEL: Primary | ICD-10-CM

## 2024-11-19 ENCOUNTER — TELEPHONE (OUTPATIENT)
Dept: FAMILY MEDICINE CLINIC | Facility: CLINIC | Age: 54
End: 2024-11-19

## 2024-11-19 ENCOUNTER — HOSPITAL ENCOUNTER (OUTPATIENT)
Dept: GENERAL RADIOLOGY | Age: 54
Discharge: HOME | End: 2024-11-19
Attending: FAMILY MEDICINE
Payer: COMMERCIAL

## 2024-11-19 ENCOUNTER — OFFICE VISIT (OUTPATIENT)
Dept: FAMILY MEDICINE CLINIC | Facility: CLINIC | Age: 54
End: 2024-11-19
Payer: COMMERCIAL

## 2024-11-19 VITALS
RESPIRATION RATE: 14 BRPM | OXYGEN SATURATION: 96 % | BODY MASS INDEX: 31.36 KG/M2 | WEIGHT: 177 LBS | SYSTOLIC BLOOD PRESSURE: 100 MMHG | TEMPERATURE: 97 F | DIASTOLIC BLOOD PRESSURE: 68 MMHG | HEART RATE: 77 BPM | HEIGHT: 63 IN

## 2024-11-19 DIAGNOSIS — J02.9 SORE THROAT: ICD-10-CM

## 2024-11-19 DIAGNOSIS — J02.0 STREP THROAT: Primary | ICD-10-CM

## 2024-11-19 DIAGNOSIS — R05.1 ACUTE COUGH: ICD-10-CM

## 2024-11-19 DIAGNOSIS — J45.21 MILD INTERMITTENT ASTHMA WITH EXACERBATION (HCC): ICD-10-CM

## 2024-11-19 LAB
CONTROL LINE PRESENT WITH A CLEAR BACKGROUND (YES/NO): YES YES/NO
KIT LOT #: NORMAL NUMERIC
STREP GRP A CUL-SCR: POSITIVE

## 2024-11-19 PROCEDURE — 87880 STREP A ASSAY W/OPTIC: CPT | Performed by: FAMILY MEDICINE

## 2024-11-19 PROCEDURE — 71046 X-RAY EXAM CHEST 2 VIEWS: CPT | Performed by: FAMILY MEDICINE

## 2024-11-19 PROCEDURE — 99214 OFFICE O/P EST MOD 30 MIN: CPT | Performed by: FAMILY MEDICINE

## 2024-11-19 RX ORDER — BUDESONIDE AND FORMOTEROL FUMARATE DIHYDRATE 160; 4.5 UG/1; UG/1
2 AEROSOL RESPIRATORY (INHALATION) 2 TIMES DAILY
Qty: 1 EACH | Refills: 0 | Status: SHIPPED | OUTPATIENT
Start: 2024-11-19

## 2024-11-19 RX ORDER — ALBUTEROL SULFATE 90 UG/1
1 INHALANT RESPIRATORY (INHALATION) EVERY 6 HOURS PRN
Qty: 1 EACH | Refills: 0 | Status: SHIPPED | OUTPATIENT
Start: 2024-11-19

## 2024-11-19 NOTE — PROGRESS NOTES
So Drummond is a 54 year old female.  Sore throat, cough  HPI:   Patient not feeling well since end of October.  On October 29 she went to urgent care in [place].  She was given a course of prednisone and Z-Yariel.  She finished course of antibiotic.  She still coughing.  Getting some secretions.  Sometimes yellowish phlegm.  Has also stuffiness in the sinuses some drainage on the back and sore throat worse with swallowing.  No fever.  Having some chest congestion.  Has history of asthma usually gets worse with any infections.  It takes her always longer time to get better with her cold symptoms.  She denies any fever right now.  Will proceed back with x-ray since the symptoms are lasting for 3 weeks.      Current Outpatient Medications   Medication Sig Dispense Refill    amoxicillin clavulanate 875-125 MG Oral Tab Take 1 tablet by mouth 2 (two) times daily for 10 days. 20 tablet 0    Budesonide-Formoterol Fumarate (SYMBICORT) 160-4.5 MCG/ACT Inhalation Aerosol Inhale 2 puffs into the lungs 2 (two) times daily. 1 each 0    albuterol 108 (90 Base) MCG/ACT Inhalation Aero Soln Inhale 1 puff into the lungs every 6 (six) hours as needed for Wheezing. 1 each 0    AMBIEN 10 MG Oral Tab TAKE 1 TABLET BY MOUTH NIGHTLY AS NEEDED FOR SLEEP 30 tablet 0      Past Medical History:    Blood in urine    Found by lab test of annual physical check    Headache disorder    long time, occasional migraine    Heartburn    frequent    Hemorrhoids    long time, occasional    High cholesterol    long time    Menses painful    Yes before menopause    Moderate persistent asthma without complication (HCC)    SINUSITIS    Wears glasses      Social History:  Social History     Socioeconomic History    Marital status:     Number of children: 2   Occupational History    Occupation: Managment   Tobacco Use    Smoking status: Never    Smokeless tobacco: Never   Vaping Use    Vaping status: Never Used   Substance and Sexual Activity    Alcohol  use: No     Alcohol/week: 0.0 standard drinks of alcohol    Drug use: No    Sexual activity: Yes     Partners: Male   Other Topics Concern     Service No    Blood Transfusions No    Caffeine Concern No    Occupational Exposure No    Hobby Hazards No    Sleep Concern No    Stress Concern No    Weight Concern No    Special Diet No    Back Care Yes    Exercise Yes    Bike Helmet No    Seat Belt Yes    Self-Exams Yes   Social History Narrative        ObGynHx:    First Menses: 11    Cycle Characteristics: regular every 28-30 days    Menopause: n/a    ObHx:     Breast: no biopsies or lumps        Social History:      Marital Status and Family/Children: , 2 children, 1 brother    Occupation/Financial Situation: works in management, 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    Cultural/Background: grew up in [place]    Pets: none    Diet: no dietary restrictions or food allergies    Exercise: 30 minutes per day, 2x per week    Sleep: 7 hours per night    SmokingHx: never    Alcohol Use: rare    Drug Use: never        REVIEW OF SYSTEMS:   GENERAL HEALTH: feels well otherwise tiredness,   SKIN: denies any unusual skin lesions or rashes  HEENT  nasal sinus congestion , has runny nose, sore throat   Neck no neck pain   RESPIRATORY: Chest congestion,  cough  CARDIOVASCULAR: denies chest pain on exertion  GI: denies abdominal pain and denies heartburn  NEURO: denies headaches  Psych normal mood    EXAM:   /68 (BP Location: Right arm, Patient Position: Sitting, Cuff Size: adult)   Pulse 77   Temp 96.9 °F (36.1 °C) (Temporal)   Resp 14   Ht 5' 3\" (1.6 m)   Wt 177 lb (80.3 kg)   SpO2 96%   BMI 31.35 kg/m²   GENERAL: well developed, well nourished,in no apparent distress congested,   SKIN: no rashes,no suspicious lesions  HEENT: atraumatic, normocephalic,ears are clear, irritation of the posterior throat and postnasal drip   NECK: supple,no adenopathy  LUNGS: Coarse breath sounds diminished at the bases with  mild bronchospasm at the end of the cough  CARDIO: RRR without murmur  GI: good BS's,no masses, HSM or tenderness  EXTREMITIES: no cyanosis, clubbing or edema  Psychiatric - alert  and oriented x3, normal mood        Results for orders placed or performed in visit on 11/19/24   Rapid Strep    Collection Time: 11/19/24 11:29 AM   Result Value Ref Range    Strep Grp A Screen Positive Negative    Control Line Present with a clear background (yes/no) Yes Yes/No    Kit Lot # [id number] Numeric    Kit Expiration Date 11- Date      ASSESSMENT AND PLAN:     Encounter Diagnoses   Name Primary?    Strep throat Yes    Sore throat     Acute cough     Mild intermittent asthma with exacerbation (HCC)        Orders Placed This Encounter   Procedures    Rapid Strep       Meds & Refills for this Visit:  Requested Prescriptions     Signed Prescriptions Disp Refills    amoxicillin clavulanate 875-125 MG Oral Tab 20 tablet 0     Sig: Take 1 tablet by mouth 2 (two) times daily for 10 days.    Budesonide-Formoterol Fumarate (SYMBICORT) 160-4.5 MCG/ACT Inhalation Aerosol 1 each 0     Sig: Inhale 2 puffs into the lungs 2 (two) times daily.    albuterol 108 (90 Base) MCG/ACT Inhalation Aero Soln 1 each 0     Sig: Inhale 1 puff into the lungs every 6 (six) hours as needed for Wheezing.   Start antibiotic today per directions.  Take probiotic over-the-counter daily like organic yogurt while taking antibiotic.  Change toothbrush Thursday morning.  Drink plenty of fluids.  Take Tylenol or ibuprofen as needed for fever or for pain.    Nasal spray saline over-the-counter as needed.  Lozenges over-the-counter as needed.   Symbicort inhaler 2 puffs twice a day for 1 week and then when you feel better decrease to 1 puff twice a day for 1 week and stop if cough resolves.  Use albuterol inhaler as needed.  Do chest x-ray today.  Call with update in 1 week.      Imaging & Consults:  None     chest x-ray reviewed patient notified.    PROCEDURE:  XR  CHEST PA + LAT CHEST (CPT=71046)     INDICATIONS:  R05.1 Acute cough     COMPARISON:  None.     TECHNIQUE:  PA and lateral chest radiographs were obtained.     PATIENT STATED HISTORY: (As transcribed by Technologist)  Patient has a cough with light yellow mucus for 3 weeks.                       Impression   CONCLUSION:       Normal cardiac and mediastinal contours.  No pulmonary edema or focal airspace consolidation.  The pleural spaces are clear.  Regional osseous structures are normal.           LOCATION:  [facility]        Dictated by (CST): Yonatan Mosley MD on 11/19/2024 at 12:17 PM      Finalized by (CST): Yonatan Mosley MD on 11/19/2024 at 12:19 PM       The patient indicates understanding of these issues and agrees to the plan.  The patient is asked to return in prn.  In 1 week.  The note was dictated using speech recognition software.  Accuracy and grammar in transcription may be subject to error.

## 2024-11-19 NOTE — TELEPHONE ENCOUNTER
If she cannot make it today at 1115 I will work her in on my schedule if she cannot make it out suggest to go back to urgent care for reevaluation.  Thank you

## 2024-11-19 NOTE — TELEPHONE ENCOUNTER
I called patient and informed of provider's recommendation. Scheduled appointment today at 11:15AM. Patient voiced understanding and agreed with plan of care.

## 2024-11-19 NOTE — PATIENT INSTRUCTIONS
Start antibiotic today per directions.  Take probiotic over-the-counter daily like organic yogurt while taking antibiotic.  Change toothbrush Thursday morning.  Drink plenty of fluids.  Take Tylenol or ibuprofen as needed for fever or for pain.    Nasal spray saline over-the-counter as needed.  Lozenges over-the-counter as needed.   Symbicort inhaler 2 puffs twice a day for 1 week and then when you feel better decrease to 1 puff twice a day for 1 week and stop if cough resolves.  Use albuterol inhaler as needed.  Do chest x-ray today.  Call with update in 1 week.

## 2024-11-19 NOTE — TELEPHONE ENCOUNTER
1. What are your symptoms?  Cough, congestion      2. How long have you been having these symptoms?  Over two weeks      3. Have you done anything already to treat your symptoms?   Z-pack, prednisone      ADDITIONAL INFO:   Patient went to  three weeks ago and was given zpack and prednisone. Patient states cough is still ongoing and bad at night. Patient is requesting to see doctor or speak to nurse about treatments

## 2024-11-19 NOTE — TELEPHONE ENCOUNTER
Please see Telephone encounter 10/29/2024 for further information.   Called patient and she states she was evaluated in [facility] in [place] 10/29/2024.   Unable to see Urgent Care notes since it's external. Patient will have Urgent care send after visit notes to our office so Dr. Hansen can review notes.    Patient states her symptoms got better after completing antibiotic and prednisone prescriptions but she still has persistent productive cough.     Patient is asking if Dr. Hansen can work her in for follow up. Please advise. Thanks.

## 2024-11-21 ENCOUNTER — TELEPHONE (OUTPATIENT)
Dept: FAMILY MEDICINE CLINIC | Facility: CLINIC | Age: 54
End: 2024-11-21

## 2024-11-21 RX ORDER — FLUTICASONE FUROATE AND VILANTEROL 200; 25 UG/1; UG/1
1 POWDER RESPIRATORY (INHALATION) DAILY
Qty: 1 EACH | Refills: 0 | Status: SHIPPED | OUTPATIENT
Start: 2024-11-21

## 2024-11-21 NOTE — TELEPHONE ENCOUNTER
Patient called back and informed of Rx sent and Dr. Hansen's note below.   Patient verbalized understanding.

## 2024-11-21 NOTE — TELEPHONE ENCOUNTER
Spoke to Mary, pharmacy tech from [facility]     States that they faxed over request for prior authorization for Symbicort. Per insurance,  Breo Ellipta is covered but is not in the same formulary.     Will follow up if paper work received in office. Routed to Dr. Hansen if ok to change prescription to Breo.  Thank you.

## 2024-11-21 NOTE — TELEPHONE ENCOUNTER
I called in Breo to [place].  Please let the patient know hopefully that would be okay for her.  There is also good Rx coupons she could try and see if those medications either Symbicort or Breo would be on good Rx.  That could bring the price down.  Thanks

## 2024-11-21 NOTE — TELEPHONE ENCOUNTER
Budesonide-Formoterol Fumarate (SYMBICORT) 160-4.5 MCG/ACT Inhalation Aerosol 1 each 0 11/19/2024 --       [id number] [place] [facility] [facility] [phone], [phone]            This medication is not covered by insurance.     Per patient the [employer] was suppose to get in contact to get another medication covered.   Per patient medication needs to go through prior authorization.     Patient is following up

## 2024-11-25 ENCOUNTER — TELEPHONE (OUTPATIENT)
Dept: FAMILY MEDICINE CLINIC | Facility: CLINIC | Age: 54
End: 2024-11-25

## 2024-11-25 RX ORDER — CEFDINIR 300 MG/1
300 CAPSULE ORAL 2 TIMES DAILY
Qty: 14 CAPSULE | Refills: 0 | Status: SHIPPED | OUTPATIENT
Start: 2024-11-25

## 2024-11-25 NOTE — TELEPHONE ENCOUNTER
Will switch patient to Omnicef 300 mg 1 capsule twice a day for 7 days.  She can stop amoxicillin/clavulanic acid.  Please ask if she is taking Breo inhaler for her cough?  Use Flonase nasal spray over-the-counter for nasal congestion.  Try Claritin 10 mg 1 tablet daily over-the-counter.  Thank you

## 2024-11-25 NOTE — TELEPHONE ENCOUNTER
Patient was seen in office 11/19 for strep throat  Patient was advised to call the office in 1 week for condition update.     States that she's on her 7th day of amoxicillin and that her symptoms has not improved. Persistent productive cough, stuffiness in the sinuses, and sore throat.     No fever. Patient wants to know if she should change her antibiotics.   Dr. Hansen, please advise. Thank you.

## 2024-11-25 NOTE — TELEPHONE ENCOUNTER
Patient states doctor told her to call back if she was not getting better. Patient has strep throat and is on antibiotic and inhaler for 6 days.

## 2024-11-25 NOTE — TELEPHONE ENCOUNTER
Patient informed of Dr. Hansen's recommendation below.   Patient verbalized understanding and agreed with plan. States that she's also using Breo inhaler for cough.     Omnicef 300mg Rx sent to [facility] per patient's request.

## 2024-12-20 RX ORDER — FLUTICASONE FUROATE AND VILANTEROL TRIFENATATE 200; 25 UG/1; UG/1
1 POWDER RESPIRATORY (INHALATION) DAILY
Qty: 1 EACH | Refills: 0 | Status: SHIPPED | OUTPATIENT
Start: 2024-12-20

## 2024-12-20 NOTE — TELEPHONE ENCOUNTER
Last office visit: 10/21/24  Next office visit: none stated  Last Refill:11/21/24    Requested Prescriptions     Pending Prescriptions Disp Refills    BREO ELLIPTA 200-25 MCG/ACT Inhalation Aerosol Powder, Breath Activated [Pharmacy Med Name: Breo Ellipta 200-25 Mcg/Act Inh Glax]  0     Sig: Inhale 1 puff into the lungs daily.     Protocol passed

## 2025-01-01 DIAGNOSIS — G47.9 SLEEP DIFFICULTIES: ICD-10-CM

## 2025-01-02 ENCOUNTER — TELEPHONE (OUTPATIENT)
Dept: FAMILY MEDICINE CLINIC | Facility: CLINIC | Age: 55
End: 2025-01-02

## 2025-01-02 NOTE — TELEPHONE ENCOUNTER
Patient seen in office 11/19/24 for strep throat urgent care follow up.   Per office visit notes, patient is having persistent cough since end of October.   Completed course of prednisone, Z-gianna, Omnicef 300 antibiotic course- see TE 11/25/24. Patient uses breo for cough as well.     Chest x-ray done 11/19/24. Patient is aware that Dr. Hansen is out of the office and she's fine to wait until Monday. Routed to Dr. Hansen. Thank you.

## 2025-01-02 NOTE — TELEPHONE ENCOUNTER
Patient informed of Dr. Swenson's note below. Offered appointment with Dr. Hansen on 1/10/25.     States that she will wait until Dr. Hansen comes back on Monday if possible to be seen sooner.

## 2025-01-02 NOTE — TELEPHONE ENCOUNTER
Patient was seen by Dr. BRADY 2 months ago for cough and patient spouse states patient is going on 3 months with cough. Would like to know if patient can come in with provider to be reevaluated. Please advise.

## 2025-01-03 RX ORDER — ZOLPIDEM TARTRATE 10 MG/1
10 TABLET, FILM COATED ORAL NIGHTLY PRN
Qty: 30 TABLET | Refills: 0 | OUTPATIENT
Start: 2025-01-03

## 2025-01-03 NOTE — TELEPHONE ENCOUNTER
LOV: 10/21/24    NOV: None scheduled    Requesting refill: ambien 10    Last refill: 10/24/24 #30       Dr. Hansen, please see pended Rx for your approval. Thank you.

## 2025-01-07 ENCOUNTER — OFFICE VISIT (OUTPATIENT)
Dept: FAMILY MEDICINE CLINIC | Facility: CLINIC | Age: 55
End: 2025-01-07
Payer: COMMERCIAL

## 2025-01-07 VITALS
HEIGHT: 63 IN | HEART RATE: 77 BPM | TEMPERATURE: 97 F | BODY MASS INDEX: 31.54 KG/M2 | DIASTOLIC BLOOD PRESSURE: 70 MMHG | RESPIRATION RATE: 16 BRPM | WEIGHT: 178 LBS | SYSTOLIC BLOOD PRESSURE: 107 MMHG | OXYGEN SATURATION: 98 %

## 2025-01-07 DIAGNOSIS — R12 HEARTBURN: ICD-10-CM

## 2025-01-07 DIAGNOSIS — G47.9 SLEEP DIFFICULTIES: ICD-10-CM

## 2025-01-07 DIAGNOSIS — R05.2 SUBACUTE COUGH: Primary | ICD-10-CM

## 2025-01-07 PROCEDURE — 99214 OFFICE O/P EST MOD 30 MIN: CPT | Performed by: FAMILY MEDICINE

## 2025-01-07 RX ORDER — METHYLPREDNISOLONE 4 MG/1
TABLET ORAL
Qty: 1 EACH | Refills: 0 | Status: SHIPPED | OUTPATIENT
Start: 2025-01-07

## 2025-01-07 RX ORDER — ZOLPIDEM TARTRATE 10 MG/1
10 TABLET, FILM COATED ORAL NIGHTLY PRN
Qty: 30 TABLET | Refills: 1 | Status: SHIPPED | OUTPATIENT
Start: 2025-01-07

## 2025-01-07 RX ORDER — FLUTICASONE PROPIONATE 50 MCG
2 SPRAY, SUSPENSION (ML) NASAL DAILY
Qty: 1 EACH | Refills: 1 | Status: SHIPPED | OUTPATIENT
Start: 2025-01-07 | End: 2026-01-02

## 2025-01-07 RX ORDER — OMEPRAZOLE 40 MG/1
40 CAPSULE, DELAYED RELEASE ORAL DAILY
Qty: 30 CAPSULE | Refills: 1 | Status: SHIPPED | OUTPATIENT
Start: 2025-01-07 | End: 2026-01-02

## 2025-01-07 NOTE — PATIENT INSTRUCTIONS
Start Medrol Dosepak tomorrow morning and take per directions.  Start Flonase 2 sprays nostril once a day.  Claritin 10 mg 1 tablet daily over-the-counter.  Start omeprazole 40 mg 1 tablet half an hour before breakfast.  Keep good hydration.  Monitor symptoms follow-up in office in 2 to 3 weeks.  If cough would not get better we will proceed with additional testing.  Continue using Ambien for sleep as needed.

## 2025-01-07 NOTE — PROGRESS NOTES
So Drummond is a 54 year old female.  Persistent cough, sleeping problem  HPI:   Patient is coming for reevaluation of persistent cough.  Initially started  end of October.  She was seen in urgent care in [place] started on Z-Yariel then and was given Omnicef and inhaler with steroids.  Symptoms get better but she still coughing.  The cough is worse in the afternoon.  In the morning she is feeling okay.  Mucus is clear.  The cough is uncomfortable.  After coughing she blows her nose.  When the temperatures called cough is worse.  When she is exercising she is doing okay no cough.  Smell will trigger the cough.  Also when having some dry nuts she will start coughing.  Having a little more heartburn in the chest.  Was using Pepcid for 3 days.  No shortness of breath.  No fever.  No chest pain.  Some postnasal drip.  No ear pain.  No sore throat.  X-ray was done in November-normal.    Sleeping difficulties patient is using zolpidem 10 mg she is cutting tablet in 3 pieces.  It helps her to sleep at night.  Would like to continue medication refill was called in.    Current Outpatient Medications   Medication Sig Dispense Refill    AMBIEN 10 MG Oral Tab Take 1 tablet (10 mg total) by mouth nightly as needed for Sleep. 30 tablet 1    methylPREDNISolone (MEDROL) 4 MG Oral Tablet Therapy Pack As directed. 1 each 0    fluticasone propionate 50 MCG/ACT Nasal Suspension 2 sprays by Each Nare route daily. 1 each 1    Omeprazole 40 MG Oral Capsule Delayed Release Take 1 capsule (40 mg total) by mouth daily. 30 capsule 1    fluticasone furoate-vilanterol (BREO ELLIPTA) 200-25 MCG/ACT Inhalation Aerosol Powder, Breath Activated Inhale 1 puff into the lungs daily. 1 each 0    Budesonide-Formoterol Fumarate (SYMBICORT) 160-4.5 MCG/ACT Inhalation Aerosol Inhale 2 puffs into the lungs 2 (two) times daily. 1 each 0    albuterol 108 (90 Base) MCG/ACT Inhalation Aero Soln Inhale 1 puff into the lungs every 6 (six) hours as needed for  Wheezing. 1 each 0      Past Medical History:    Blood in urine    Found by lab test of annual physical check    Headache disorder    long time, occasional migraine    Heartburn    frequent    Hemorrhoids    long time, occasional    High cholesterol    long time    Menses painful    Yes before menopause    Moderate persistent asthma without complication (HCC)    SINUSITIS    Wears glasses      Social History:  Social History     Socioeconomic History    Marital status:     Number of children: 2   Occupational History    Occupation: Managment   Tobacco Use    Smoking status: Never    Smokeless tobacco: Never   Vaping Use    Vaping status: Never Used   Substance and Sexual Activity    Alcohol use: No     Alcohol/week: 0.0 standard drinks of alcohol    Drug use: No    Sexual activity: Yes     Partners: Male   Other Topics Concern     Service No    Blood Transfusions No    Caffeine Concern No    Occupational Exposure No    Hobby Hazards No    Sleep Concern No    Stress Concern No    Weight Concern No    Special Diet No    Back Care Yes    Exercise Yes    Bike Helmet No    Seat Belt Yes    Self-Exams Yes   Social History Narrative        ObGynHx:    First Menses: 11    Cycle Characteristics: regular every 28-30 days    Menopause: n/a    ObHx:     Breast: no biopsies or lumps        Social History:      Marital Status and Family/Children: , 2 children, 1 brother    Occupation/Financial Situation: works in management, 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    Cultural/Background: grew up in [place]    Pets: none    Diet: no dietary restrictions or food allergies    Exercise: 30 minutes per day, 2x per week    Sleep: 7 hours per night    SmokingHx: never    Alcohol Use: rare    Drug Use: never        REVIEW OF SYSTEMS:   GENERAL HEALTH: feels well otherwise  SKIN: denies any unusual skin lesions or rashes  HEENT no congestion no runny nose no sore throat  Neck no neck pain   RESPIRATORY: denies  shortness of breath with exertion, having persistent cough  CARDIOVASCULAR: denies chest pain on exertion  GI: denies abdominal pain and denies heartburn  NEURO: denies headaches   Psych normal mood.    EXAM:   /70 (BP Location: Left arm, Patient Position: Sitting, Cuff Size: adult)   Pulse 77   Temp 97.4 °F (36.3 °C) (Temporal)   Resp 16   Ht 5' 3\" (1.6 m)   Wt 178 lb (80.7 kg)   SpO2 98%   BMI 31.53 kg/m²   GENERAL: well developed, well nourished,in no apparent distress, no cough during office visit  SKIN: no rashes,no suspicious lesions  HEENT: atraumatic, normocephalic,ears -there is minimal fluid behind right tympanic membrane, irritation of the posterior throat , no sinus pressure  NECK: supple,no adenopathy,  Chest - clear to auscultation, no wheezes no crackles  CARDIO: RRR without murmur  GI: good BS's,no masses, HSM or tenderness  EXTREMITIES: no cyanosis, clubbing or edema  Psychiatric - alert  and oriented x3, normal mood        ASSESSMENT AND PLAN:     Encounter Diagnoses   Name Primary?    Subacute cough Yes    Sleep difficulties     Heartburn        No orders of the defined types were placed in this encounter.      Meds & Refills for this Visit:  Requested Prescriptions     Signed Prescriptions Disp Refills    AMBIEN 10 MG Oral Tab 30 tablet 1     Sig: Take 1 tablet (10 mg total) by mouth nightly as needed for Sleep.    methylPREDNISolone (MEDROL) 4 MG Oral Tablet Therapy Pack 1 each 0     Sig: As directed.    fluticasone propionate 50 MCG/ACT Nasal Suspension 1 each 1     Si sprays by Each Nare route daily.    Omeprazole 40 MG Oral Capsule Delayed Release 30 capsule 1     Sig: Take 1 capsule (40 mg total) by mouth daily.     Start Medrol Dosepak tomorrow morning and take per directions.  Start Flonase 2 sprays nostril once a day.  Claritin 10 mg 1 tablet daily over-the-counter.  Start omeprazole 40 mg 1 tablet half an hour before breakfast.  Keep good hydration.  Monitor symptoms  follow-up in office in 2 to 3 weeks.  If cough would not get better we will proceed with additional testing.  Continue using Ambien for sleep as needed.    Imaging & Consults:  None  the patient indicates understanding of these issues and agrees to the plan.  The patient is asked to return in  2 To 3 weeks.  The note was dictated using speech recognition software.  Accuracy and grammar in transcription may be subject to error.

## 2025-01-08 RX ORDER — ZOLPIDEM TARTRATE 10 MG/1
10 TABLET, FILM COATED ORAL NIGHTLY PRN
Qty: 30 TABLET | Refills: 0 | OUTPATIENT
Start: 2025-01-08

## 2025-02-26 ENCOUNTER — TELEPHONE (OUTPATIENT)
Dept: FAMILY MEDICINE CLINIC | Facility: CLINIC | Age: 55
End: 2025-02-26

## 2025-02-26 NOTE — TELEPHONE ENCOUNTER
ask the following questions:  What are your symptoms? Cough, chest pain, sinus pressure, runny nose.   Ask the pt to describe severity of symptoms:  Example: headache (pain level from 0-10, worst headache of your life?)  8  How long have you been having these symptoms? Since Saturday  Have you done anything already to treat your symptoms? Tylenol xtra strength and ibuprofen, albuterol inhaler.                       *Then route LIVE CALL to triage.

## 2025-02-26 NOTE — TELEPHONE ENCOUNTER
Lets have the patient go to urgent care for evaluation.  She will need an x-ray to make sure there is no pneumonia especially that she feels blood with coughing.  Thank you

## 2025-02-26 NOTE — TELEPHONE ENCOUNTER
Patient informed of Dr. Hansen's recommendation below. Patient verbalized understanding and agreed with plan.

## 2025-02-26 NOTE — TELEPHONE ENCOUNTER
Patient's symptoms started Saturday, sore throat, sinus pressure, runny nose, productive cough. Feels like she can start tasting blood when coughing but she's not coughing out blood. Middle chest pain when coughing, states that it's not in the left side of her chest.     No fever. States that she had Covid 5 months ago and does not think this is Covid.  Took ibuprofen, inhaler, tylenol with no relief. Patient is requesting to be seen.

## 2025-03-03 DIAGNOSIS — G47.9 SLEEP DIFFICULTIES: ICD-10-CM

## 2025-03-04 RX ORDER — ZOLPIDEM TARTRATE 10 MG/1
10 TABLET, FILM COATED ORAL NIGHTLY PRN
Qty: 30 TABLET | Refills: 0 | Status: SHIPPED | OUTPATIENT
Start: 2025-03-04

## 2025-03-04 NOTE — TELEPHONE ENCOUNTER
LOV:1/7/2025   for: Persistent cough, sleeping problem   Patient advised to RTC on: 2 To 3 weeks.    Medication Quantity Refills Start End   AMBIEN 10 MG Oral Tab 30 tablet 1 1/7/2025 --   Sig:   Take 1 tablet (10 mg total) by mouth nightly as needed for Sleep.

## (undated) NOTE — LETTER
01/06/22        Jes Vásquez  [phone]      Dear Renetta Wilson records indicate that you have outstanding lab work and or testing that was ordered for you and has not yet been completed:  Orders Placed This Encounter      Lizzy

## (undated) NOTE — LETTER
ASTHMA ACTION PLAN for So Drummond     : 1970     Date: 10/21/2024  Provider:  Jennifer Hansen MD  Phone for doctor or clinic: [facility], [address] [place] [id number] [phone]           You can use the colors of a traffic light to help learn about your asthma medicines.      1. Green - Go! % of Personal Best Peak Flow Use controller medicine.   Breathing is good  No cough or wheeze  Can work and play Medicine How much to take When to take it    Albuterol inhaler,  1 puff every six hours as needed.        2. Yellow - Caution. 50-79% Personal Best Peak  Flow.  Use reliever medicine to keep an asthma attack from getting bad.   Cough  Wheezing  Tight Chest  Wake up at night Medicine How much to take When to take it    Symbicort 160-4.5 mcg -2 puffs into the lungs 2 times daily          Additional instructions         3. Red - Stop! Danger!  <50% Personal Best Peak  Flow. Take these medications until  Get help from a doctor   Medicine not helping  Breathing is hard and fast  Nose opens wide  Can't walk  Ribs show  Can't talk well Medicine How much to take When to take it    Go to the nearest Emergency Room/Department or sara 911       Additional Instructions If your symptoms do not improve and you cannot contact your doctor, [facility] or call [facility] immediately!     [x] Asthma Action Plan reviewed with patient (and caregiver if necessary) and a copy of the plan was given to the patient/caregiver.   [] Asthma Action Plan reviewed with patient (and caregiver if necessary) on the phone and mailed copy to patient or submitted via [employer].     Signatures:  Provider  Jnenifer Hansen MD   Patient Caretaker

## (undated) NOTE — LETTER
ASTHMA ACTION PLAN for Kareem Montes     : 1970     Date: 2023  Provider:  Mazin Fernandez MD  Phone for doctor or clinic: [facility] [phone] [employer]    ACT Score: 22      You can use the colors of a traffic light to help learn about your asthma medicines. 1. Green - Go! % of Personal Best Peak Flow Use controller medicine. Breathing is good  No cough or wheeze  Can work and play Medicine How much to take When to take it    SYMBICORT -2 times daily      2. Yellow - Caution. 50-79% Personal Best Peak  Flow. Use reliever medicine to keep an asthma attack from getting bad. Cough  Wheezing  Tight Chest  Wake up at night Medicine How much to take When to take it    Albuterol inhaler,  2 puffs every four hours as needed. Additional instructions         3. Red - Stop! Danger!  <50% Personal Best Peak  Flow. Take these medications until  Get help from a doctor   Medicine not helping  Breathing is hard and fast  Nose opens wide  Can't walk  Ribs show  Can't talk well Medicine How much to take When to take it    Go to the nearest Emergency Room/Department or call 911           Additional Instructions If your symptoms do not improve and you cannot contact your doctor, [facility] or call 911 immediately! [x] Asthma Action Plan reviewed with patient (and caregiver if necessary) and a copy of the plan was given to the patient/caregiver. [] Asthma Action Plan reviewed with patient (and caregiver if necessary) on the phone and mailed copy to patient or submitted via [address]      Signatures:  Provider  Mazin Fernandez MD   Patient Caretaker